# Patient Record
Sex: MALE | Race: WHITE | NOT HISPANIC OR LATINO | Employment: FULL TIME | URBAN - METROPOLITAN AREA
[De-identification: names, ages, dates, MRNs, and addresses within clinical notes are randomized per-mention and may not be internally consistent; named-entity substitution may affect disease eponyms.]

---

## 2019-10-14 ENCOUNTER — OFFICE VISIT (OUTPATIENT)
Dept: FAMILY MEDICINE | Facility: CLINIC | Age: 34
End: 2019-10-14
Payer: COMMERCIAL

## 2019-10-14 ENCOUNTER — ANCILLARY PROCEDURE (OUTPATIENT)
Dept: GENERAL RADIOLOGY | Facility: CLINIC | Age: 34
End: 2019-10-14
Attending: FAMILY MEDICINE
Payer: COMMERCIAL

## 2019-10-14 VITALS
WEIGHT: 270.4 LBS | TEMPERATURE: 98 F | RESPIRATION RATE: 12 BRPM | OXYGEN SATURATION: 98 % | HEIGHT: 72 IN | DIASTOLIC BLOOD PRESSURE: 100 MMHG | BODY MASS INDEX: 36.62 KG/M2 | SYSTOLIC BLOOD PRESSURE: 148 MMHG | HEART RATE: 86 BPM

## 2019-10-14 DIAGNOSIS — E66.01 MORBID OBESITY (H): ICD-10-CM

## 2019-10-14 DIAGNOSIS — M54.50 ACUTE MIDLINE LOW BACK PAIN WITHOUT SCIATICA: ICD-10-CM

## 2019-10-14 DIAGNOSIS — I10 ESSENTIAL HYPERTENSION: ICD-10-CM

## 2019-10-14 DIAGNOSIS — M54.50 ACUTE MIDLINE LOW BACK PAIN WITHOUT SCIATICA: Primary | ICD-10-CM

## 2019-10-14 PROCEDURE — 99203 OFFICE O/P NEW LOW 30 MIN: CPT | Performed by: FAMILY MEDICINE

## 2019-10-14 PROCEDURE — 72100 X-RAY EXAM L-S SPINE 2/3 VWS: CPT

## 2019-10-14 RX ORDER — IBUPROFEN 600 MG/1
600 TABLET, FILM COATED ORAL EVERY 6 HOURS
Qty: 40 TABLET | Refills: 1 | Status: SHIPPED | OUTPATIENT
Start: 2019-10-14 | End: 2019-12-24

## 2019-10-14 RX ORDER — CYCLOBENZAPRINE HCL 10 MG
10 TABLET ORAL 3 TIMES DAILY PRN
Qty: 30 TABLET | Refills: 1 | Status: SHIPPED | OUTPATIENT
Start: 2019-10-14 | End: 2019-12-24

## 2019-10-14 ASSESSMENT — MIFFLIN-ST. JEOR: SCORE: 2209.53

## 2019-10-14 ASSESSMENT — PAIN SCALES - GENERAL: PAINLEVEL: SEVERE PAIN (6)

## 2019-10-14 NOTE — PROGRESS NOTES
SUBJECTIVE:                                                    Shady Maguire is 34 year old male   Chief Complaint   Patient presents with     Back Pain     Symptoms started last Monday while putting on socks, states no injury. States pain increased while putting on boots yesterday. Increased pain with  Midline low back pain to tailbone without radiation. States no numbness or tingling. Has tried IBU, ice, and rest, states not helpful. States no previous back issues.      Problem list and histories reviewed & adjusted, as indicated.  Additional history: works at desk job, finished basement a month ago, no back issues then.    Patient Active Problem List   Diagnosis     Essential hypertension     Acute midline low back pain without sciatica     History reviewed. No pertinent surgical history.    Social History     Tobacco Use     Smoking status: Former Smoker     Packs/day: 0.00     Smokeless tobacco: Current User     Types: Chew   Substance Use Topics     Alcohol use: Yes     History reviewed. No pertinent family history.      Current Outpatient Medications   Medication Sig Dispense Refill     cyclobenzaprine (FLEXERIL) 10 MG tablet Take 1 tablet (10 mg) by mouth 3 times daily as needed for muscle spasms 30 tablet 1     ibuprofen (ADVIL/MOTRIN) 600 MG tablet Take 1 tablet (600 mg) by mouth every 6 hours 40 tablet 1     No Known Allergies  No lab results found.   BP Readings from Last 3 Encounters:   10/14/19 (!) 148/100    Wt Readings from Last 3 Encounters:   10/14/19 122.7 kg (270 lb 6.4 oz)         ROS:  Constitutional, HEENT, cardiovascular, pulmonary, gi and gu systems are negative, except as otherwise noted.    OBJECTIVE:                                                    BP (!) 148/100   Pulse 86   Temp 98  F (36.7  C) (Tympanic)   Resp 12   Ht 1.829 m (6')   Wt 122.7 kg (270 lb 6.4 oz)   SpO2 98%   BMI 36.67 kg/m     GENERAL APPEARANCE ADULT: alert, upset, cooperative  MS: extremities normal, no  peripheral edema  back exam: general movements in the exam room are impaired due to pain, difficulty standing, no ROM checked due to discomfort  SKIN: no suspicious lesions or rashes, warm and moist  NEURO: Alert, oriented, speech and mentation normal  PSYCH: mentation appears normal., affect and mood normal  Diagnostic Test Results:  Xray lumbar spine: extra lumbar vertebrae? Splinting, no lesions     ASSESSMENT/PLAN:                                                    1. Acute midline low back pain without sciatica  Deconditioned and poor core strength, first time back pain with muscle spasms, no disc disease noted on imaging.  - XR Lumbar Spine 2/3 Views; Future  - PHYSICAL THERAPY REFERRAL; Future  - ibuprofen (ADVIL/MOTRIN) 600 MG tablet; Take 1 tablet (600 mg) by mouth every 6 hours  Dispense: 40 tablet; Refill: 1  - cyclobenzaprine (FLEXERIL) 10 MG tablet; Take 1 tablet (10 mg) by mouth 3 times daily as needed for muscle spasms  Dispense: 30 tablet; Refill: 1    2. Essential hypertension  Due to pain    3. Morbid obesity (H)  Ideal weight for height is 160, realistic weight 180 (90 lbs over).  Need to lose at least 10%, 27 pounds in 27 weeks.        Yudy Maloney MD  [facility]

## 2019-10-14 NOTE — NURSING NOTE
Initial BP (!) 148/100   Pulse 86   Temp 98  F (36.7  C) (Tympanic)   Resp 12   Ht 1.829 m (6')   Wt 122.7 kg (270 lb 6.4 oz)   SpO2 98%   BMI 36.67 kg/m   Estimated body mass index is 36.67 kg/m  as calculated from the following:    Height as of this encounter: 1.829 m (6').    Weight as of this encounter: 122.7 kg (270 lb 6.4 oz). .

## 2019-10-14 NOTE — PATIENT INSTRUCTIONS
Scheduled over the counter pain meds.     Ibuprofen 600mg orally every 6 hours scheduled for 10 days and/or   Tylenol 650 mg two orally every 12 hours scheduled for 10 days       Can take additional 1300 mg in a 24 hour time     Taking pain medication on a schedule will help to get ahead of the pain.  You are not waiting for the pain to take the medicine.  An example of scheduled routine for every 6 hours is to take 600 mg of ibuprofen at 6 am, 12 pm, 6 pm, 12 am. If this is not working to manage your pain add Tylenol 1300 mg at 6 am and 6 pm.      If you get good pain relief and it returns when stopped consider using these medications longer.  Risks from long term use of ibuprofen and tylenol  are minimal.  Ibuprofen long term can give you stomach inflammation and stomach ulcers, taking with food helps prevent this.  Consuming tylenol when using alcohol regularily can stress the liver.      Maximum dose of ibuprofen is 2400 mg a day do not exceed 3200 mg daily.  Maximum dose of Tylenol is 4000 mg a day.  Those with kidney or liver disease need to use less or none at all.   It is safe to use both Tylenol and ibuprofen together scheduled.  Expect the pain relief effect to be more than additive.    Remember that the narcotic pain medication may have ibuprofen or acetaminophen in it and that must be counted into the total amount of either for the 24 hours.

## 2019-10-16 ENCOUNTER — MYC MEDICAL ADVICE (OUTPATIENT)
Dept: FAMILY MEDICINE | Facility: CLINIC | Age: 34
End: 2019-10-16

## 2019-10-17 NOTE — TELEPHONE ENCOUNTER
This is not a chemical problem it is a mechanical problem and physical therapy is where we get this fixed.  No stronger medication can solve the cause of the pain.  Please get in to PT and follow their treatment plan and you will be better.

## 2019-10-21 ENCOUNTER — HOSPITAL ENCOUNTER (OUTPATIENT)
Dept: PHYSICAL THERAPY | Facility: CLINIC | Age: 34
Setting detail: THERAPIES SERIES
End: 2019-10-21
Attending: FAMILY MEDICINE
Payer: COMMERCIAL

## 2019-10-21 DIAGNOSIS — M54.50 ACUTE MIDLINE LOW BACK PAIN WITHOUT SCIATICA: ICD-10-CM

## 2019-10-21 PROCEDURE — 97161 PT EVAL LOW COMPLEX 20 MIN: CPT | Mod: GP | Performed by: PHYSICAL THERAPIST

## 2019-10-21 PROCEDURE — 97110 THERAPEUTIC EXERCISES: CPT | Mod: GP | Performed by: PHYSICAL THERAPIST

## 2019-10-21 NOTE — PROGRESS NOTES
10/21/19 1500   General Information   Type of Visit Initial OP Ortho PT Evaluation   Start of Care Date 10/21/19   Referring Physician Yudy Maloney MD   Patient/Family Goals Statement To get back to normal; strength to avoid re-injury   Orders Evaluate and Treat   Date of Order 10/15/19   Certification Required? No   Medical Diagnosis Acute midline low back pain without sciatica   Body Part(s)   Body Part(s) Lumbar Spine/SI   Presentation and Etiology   Pertinent history of current problem (include personal factors and/or comorbidities that impact the POC) Pt reports: 2 weeks ago he was putting coks on at the edge of his bed when he felt he tweaked his back.  Pulled on a boot later that week and that worsened the pain, was bedridden that week.   Muscle relaxor and ibuprofen did not help.  Chiropractor visit did not change.  Pt feels like it is more of a muscle injury.   Impairments A. Pain;B. Decreased WB tolerance;D. Decreased ROM;E. Decreased flexibility   Functional Limitations perform activities of daily living;perform required work activities;perform desired leisure / sports activities   Symptom Location Low back to tailbone, left > right   How/Where did it occur From insidious onset   Onset date of current episode/exacerbation 10/13/19   Chronicity New   Pain rating (0-10 point scale) Best (/10);Worst (/10)   Best (/10) 3   Worst (/10) 10   Pain quality A. Sharp;C. Aching;F. Stabbing   Frequency of pain/symptoms A. Constant   Pain/symptoms are: The same all the time   Pain/symptoms exacerbated by M. Other;B. Walking;G. Certain positions;J. ADL;I. Bending;K. Home tasks  (bending)   Pain/symptoms eased by C. Rest;F. Certain positions   Progression of symptoms since onset: Improved   Current / Previous Interventions   Diagnostic Tests: X-ray   X-ray Results Results   X-ray results MPRESSION: No acute osseous abnormality of the lumbar spine is identified.   Prior Level of Function   Prior Level of  "Function-Mobility Independent   Prior Level of Function-ADLs Independent   Current Level of Function   Current Community Support Family/friend caregiver   Patient role/employment history A. Employed   Employment Comments Works [place], walks 3 blocks to work - pain with this   Living environment [facility]   Fall Risk Screen   Fall screen completed by PT   Have you fallen 2 or more times in the past year? No   Have you fallen and had an injury in the past year? No   Is patient a fall risk? No   Abuse Screen (yes response referral indicated)   Feels Unsafe at Home or Work/School no   Feels Threatened by Someone no   Does Anyone Try to Keep You From Having Contact with Others or Doing Things Outside Your Home? no   Physical Signs of Abuse Present no   System Outcome Measures   Outcome Measures Low Back Pain (see Oswestry and Greta)   Lumbar Spine/SI Objective Findings   Balance/Proprioception (Single Leg Stance) >15 sec partha   Flexion ROM Fingertips to patella increased LBP at end range   Extension ROM 50% increased LBP at end range   Hip Screen (+) no hip IR partha; <30 deg of thoracic rotation partha   Hip Abduction Strength right=4/5; left=3+/5 with pain   Hip Extension Strength partha=4/5   Knee Extension (L3) Strength partha=5/5   Ankle Dorsiflexion (L4) Strength partha=5/5   Great Toe Extension (L5) Strength partha=5/5   Ankle Plantar Flexion (S1) Strength partha=5/5   Hamstring Flexibility 40 deg partha \"this is normal\"   Palpation Pain free with palpation of paraspinals, QL partha   Planned Therapy Interventions   Planned Therapy Interventions ADL retraining;balance training;gait training;joint mobilization;manual therapy;motor coordination training;neuromuscular re-education;ROM;strengthening;stretching   Planned Modality Interventions   Planned Modality Interventions Cryotherapy;Electrical stimulation   Clinical Impression   Criteria for Skilled Therapeutic Interventions Met yes, treatment indicated   PT Diagnosis Acute " LBP secondary to hypomobile thoracic and hips; weak core   Influenced by the following impairments pain; weakness; impaired ROM; impaired gait   Functional limitations due to impairments Difficulty with work duties, sitting   Clinical Presentation Stable/Uncomplicated   Clinical Presentation Rationale (+) motivation; age; overall health     Clinical Decision Making (Complexity) Low complexity   Therapy Frequency other (see comments)   Predicted Duration of Therapy Intervention (days/wks) 1x every 2 weeks, anticipate 1-3 visits   Risk & Benefits of therapy have been explained Yes   Patient, Family & other staff in agreement with plan of care Yes   Clinical Impression Comments Shady presents today with acute LBP that is improving with time.  He will benefit from skilled PT to address the above impairments and functional limitations to avoid future occurence.   Education Assessment   Preferred Learning Style Listening;Demonstration;Pictures/video   Barriers to Learning No barriers   ORTHO GOALS   PT Ortho Eval Goals 1;2;3   Ortho Goal 1   Goal Description Patient will have >=4+/5 partha hip ABD strength to support the lumbar spine with gait.   Target Date 12/02/19   Ortho Goal 2   Goal Description Patient will be able to put on shoes without LBP.   Target Date 11/11/19   Ortho Goal 3   Goal Description Patient will be independent with his HEP to reduce future occurrence of pain and disability.   Target Date 10/21/19   Date Met 10/21/19   Total Evaluation Time   PT Eval, Low Complexity Minutes (79521) 20       Radha Jimenez, PT, DTP, [facility]  Doctor of Physical Therapy #[id number]  [facility]  [phone]  [email or web address]

## 2019-12-24 ENCOUNTER — OFFICE VISIT (OUTPATIENT)
Dept: FAMILY MEDICINE | Facility: CLINIC | Age: 34
End: 2019-12-24
Payer: COMMERCIAL

## 2019-12-24 VITALS
SYSTOLIC BLOOD PRESSURE: 110 MMHG | HEIGHT: 71 IN | OXYGEN SATURATION: 98 % | BODY MASS INDEX: 37.24 KG/M2 | WEIGHT: 266 LBS | RESPIRATION RATE: 18 BRPM | TEMPERATURE: 98.9 F | DIASTOLIC BLOOD PRESSURE: 82 MMHG | HEART RATE: 89 BPM

## 2019-12-24 DIAGNOSIS — Z00.00 ROUTINE GENERAL MEDICAL EXAMINATION AT A HEALTH CARE FACILITY: Primary | ICD-10-CM

## 2019-12-24 PROBLEM — I10 ESSENTIAL HYPERTENSION: Status: RESOLVED | Noted: 2019-10-14 | Resolved: 2019-12-24

## 2019-12-24 PROCEDURE — 99395 PREV VISIT EST AGE 18-39: CPT | Performed by: FAMILY MEDICINE

## 2019-12-24 ASSESSMENT — MIFFLIN-ST. JEOR: SCORE: 2177.66

## 2019-12-24 NOTE — PROGRESS NOTES
3  SUBJECTIVE:   CC: Shady Maguire is an 34 year old male who presents for preventive health visit.   Patient is a 34-year-old male who comes in today for his annual physical.  He is relatively healthy and denies any acute symptoms today.  Does not take any routine medications.  He declined labs today.  Encouraged patient to continue healthy lifestyle.    Healthy Habits:    Do you get at least three servings of calcium containing foods daily (dairy, green leafy vegetables, etc.)? yes    Amount of exercise or daily activities, outside of work: not often     Problems taking medications regularly not applicable    Medication side effects: No    Have you had an eye exam in the past two years? no    Do you see a dentist twice per year? yrly    Do you have sleep apnea, excessive snoring or daytime drowsiness?no          Today's PHQ-2 Score:   PHQ-2 ( 1999 [employer]) 12/24/2019   Q1: Little interest or pleasure in doing things 0   Q2: Feeling down, depressed or hopeless 0   PHQ-2 Score 0       Abuse: Current or Past(Physical, Sexual or Emotional)- No  Do you feel safe in your environment? Yes        Social History     Tobacco Use     Smoking status: Former Smoker     Packs/day: 0.00     Smokeless tobacco: Current User     Types: Chew   Substance Use Topics     Alcohol use: Yes     If you drink alcohol do you typically have >3 drinks per day or >7 drinks per week? No                      Last PSA: No results found for: PSA    Reviewed orders with patient. Reviewed health maintenance and updated orders accordingly - Yes  BP Readings from Last 3 Encounters:   12/24/19 110/82   10/14/19 (!) 148/100    Wt Readings from Last 3 Encounters:   12/24/19 120.7 kg (266 lb)   10/14/19 122.7 kg (270 lb 6.4 oz)                  Patient Active Problem List   Diagnosis     Acute midline low back pain without sciatica     Obesity (BMI 35.0-39.9) with comorbidity (H)     History reviewed. No pertinent surgical history.    Social History  "    Tobacco Use     Smoking status: Former Smoker     Packs/day: 0.00     Smokeless tobacco: Current User     Types: Chew   Substance Use Topics     Alcohol use: Yes     Family History   Problem Relation Age of Onset     Depression Mother      Hypertension Father      Heart Disease Maternal Grandmother      Cancer Maternal Grandfather         skin ca     Heart Disease Maternal Grandfather      Heart Disease Paternal Grandmother      Heart Disease Paternal Grandfather          No current outpatient medications on file.     No Known Allergies    Reviewed and updated as needed this visit by clinical staff  Tobacco  Allergies  Meds  Problems  Med Hx  Surg Hx  Fam Hx  Soc Hx          Reviewed and updated as needed this visit by Provider  Tobacco  Allergies  Meds  Problems  Med Hx  Surg Hx  Fam Hx            ROS:  CONSTITUTIONAL: NEGATIVE for fever, chills, change in weight  INTEGUMENTARY/SKIN: NEGATIVE for worrisome rashes, moles or lesions  EYES: NEGATIVE for vision changes or irritation  ENT: NEGATIVE for ear, mouth and throat problems  RESP: NEGATIVE for significant cough or SOB  CV: NEGATIVE for chest pain, palpitations or peripheral edema  GI: NEGATIVE for nausea, abdominal pain, heartburn, or change in bowel habits   male: negative for dysuria, hematuria, decreased urinary stream, erectile dysfunction, urethral discharge  MUSCULOSKELETAL: NEGATIVE for significant arthralgias or myalgia  NEURO: NEGATIVE for weakness, dizziness or paresthesias  PSYCHIATRIC: NEGATIVE for changes in mood or affect    OBJECTIVE:   /82   Pulse 89   Temp 98.9  F (37.2  C) (Tympanic)   Resp 18   Ht 1.81 m (5' 11.25\")   Wt 120.7 kg (266 lb)   SpO2 98%   BMI 36.84 kg/m    EXAM:  GENERAL: healthy, alert and no distress  EYES: Eyes grossly normal to inspection, PERRL and conjunctivae and sclerae normal  HENT: ear canals and TM's normal, nose and mouth without ulcers or lesions  NECK: no adenopathy, no asymmetry, " "masses, or scars and thyroid normal to palpation  RESP: lungs clear to auscultation - no rales, rhonchi or wheezes  CV: regular rate and rhythm, normal S1 S2, no S3 or S4, no murmur, click or rub, no peripheral edema and peripheral pulses strong  ABDOMEN: soft, nontender, no hepatosplenomegaly, no masses and bowel sounds normal  MS: no gross musculoskeletal defects noted, no edema  SKIN: no suspicious lesions or rashes  PSYCH: mentation appears normal, affect normal/bright    Diagnostic Test Results:  none     ASSESSMENT/PLAN:   1. Routine general medical examination at a health care facility  Patient is a 34 yr old male here for his annual physical. He is otherwise healthy. Asked to continue with healthy living.       COUNSELING:  Reviewed preventive health counseling, as reflected in patient instructions       Regular exercise       Healthy diet/nutrition       Vision screening    Estimated body mass index is 36.84 kg/m  as calculated from the following:    Height as of this encounter: 1.81 m (5' 11.25\").    Weight as of this encounter: 120.7 kg (266 lb).    Weight management plan: Discussed healthy diet and exercise guidelines     reports that he has quit smoking. He smoked 0.00 packs per day. His smokeless tobacco use includes chew.      Counseling Resources:  ATP IV Guidelines  Pooled Cohorts Equation Calculator  FRAX Risk Assessment  ICSI Preventive Guidelines  Dietary Guidelines for Americans, 2010  USDA's MyPlate  ASA Prophylaxis  Lung CA Screening    Cathy Solano MD  [facility]  "

## 2020-03-11 ENCOUNTER — HEALTH MAINTENANCE LETTER (OUTPATIENT)
Age: 35
End: 2020-03-11

## 2020-03-24 NOTE — PROGRESS NOTES
Outpatient Physical Therapy Discharge Note     Patient: Shady Maguire  : 1985    Beginning/End Dates of Reporting Period:  10/21/19 to 3/24/2020    Referring Provider: Yudy Maloney MD    Therapy Diagnosis: LBP     Client Self Report: Pt reports: 2 weeks ago he was putting coks on at the edge of his bed when he felt he tweaked his back.  Pulled on a boot later that week and that worsened the pain, was bedridden that week.   Muscle relaxor and ibuprofen did not help.  Chiropractor visit did not change.  Pt feels like it is more of a muscle injury.    Objective Measurements:  Objective Measure: Greta  Details: 2, 4 medium  Objective Measure: ALEAH  Details: 48%            Goals:  Goal Identifier     Goal Description Patient will have >=4+/5 partha hip ABD strength to support the lumbar spine with gait.   Target Date 19   Date Met      Progress:     Goal Identifier     Goal Description Patient will be able to put on shoes without LBP.   Target Date 19   Date Met      Progress:     Goal Identifier     Goal Description Patient will be independent with his HEP to reduce future occurrence of pain and disability.   Target Date 10/21/19   Date Met  10/21/19   Progress:       Progress Toward Goals:   Progress this reporting period: Shady attended 1 PT session to address his acute LBP; unable to further assess progress due to patient failing to schedule f/u appointment.    Plan:  Discharge from therapy.    Discharge:    Reason for Discharge: Patient has failed to schedule further appointments.    Equipment Issued: theraband    Discharge Plan: Patient to continue home program.      Radha Jimenez, PT, DTP, [facility]  Doctor of Physical Therapy #[id number]  [facility]  [phone]  [email or web address]

## 2021-01-03 ENCOUNTER — HEALTH MAINTENANCE LETTER (OUTPATIENT)
Age: 36
End: 2021-01-03

## 2021-03-07 ENCOUNTER — HEALTH MAINTENANCE LETTER (OUTPATIENT)
Age: 36
End: 2021-03-07

## 2021-08-13 ENCOUNTER — HOSPITAL ENCOUNTER (EMERGENCY)
Facility: CLINIC | Age: 36
Discharge: HOME | End: 2021-08-13
Attending: EMERGENCY MEDICINE | Admitting: EMERGENCY MEDICINE
Payer: COMMERCIAL

## 2021-08-13 VITALS
BODY MASS INDEX: 33.86 KG/M2 | WEIGHT: 250 LBS | SYSTOLIC BLOOD PRESSURE: 115 MMHG | DIASTOLIC BLOOD PRESSURE: 67 MMHG | HEART RATE: 72 BPM | HEIGHT: 72 IN | OXYGEN SATURATION: 89 % | RESPIRATION RATE: 18 BRPM | TEMPERATURE: 98.1 F

## 2021-08-13 DIAGNOSIS — S39.012A STRAIN OF LUMBAR REGION, INITIAL ENCOUNTER: ICD-10-CM

## 2021-08-13 DIAGNOSIS — M62.830 SPASM OF BACK MUSCLES: ICD-10-CM

## 2021-08-13 PROCEDURE — 96374 THER/PROPH/DIAG INJ IV PUSH: CPT | Performed by: EMERGENCY MEDICINE

## 2021-08-13 PROCEDURE — 96375 TX/PRO/DX INJ NEW DRUG ADDON: CPT | Performed by: EMERGENCY MEDICINE

## 2021-08-13 PROCEDURE — 250N000013 HC RX MED GY IP 250 OP 250 PS 637: Performed by: EMERGENCY MEDICINE

## 2021-08-13 PROCEDURE — 99285 EMERGENCY DEPT VISIT HI MDM: CPT | Performed by: EMERGENCY MEDICINE

## 2021-08-13 PROCEDURE — 99285 EMERGENCY DEPT VISIT HI MDM: CPT | Mod: 25 | Performed by: EMERGENCY MEDICINE

## 2021-08-13 PROCEDURE — 250N000011 HC RX IP 250 OP 636: Performed by: EMERGENCY MEDICINE

## 2021-08-13 RX ORDER — DIAZEPAM 10 MG/2ML
5 INJECTION, SOLUTION INTRAMUSCULAR; INTRAVENOUS ONCE
Status: COMPLETED | OUTPATIENT
Start: 2021-08-13 | End: 2021-08-13

## 2021-08-13 RX ORDER — OXYCODONE AND ACETAMINOPHEN 5; 325 MG/1; MG/1
2 TABLET ORAL ONCE
Status: COMPLETED | OUTPATIENT
Start: 2021-08-13 | End: 2021-08-13

## 2021-08-13 RX ORDER — DIAZEPAM 10 MG/2ML
5 INJECTION, SOLUTION INTRAMUSCULAR; INTRAVENOUS ONCE
Status: DISCONTINUED | OUTPATIENT
Start: 2021-08-13 | End: 2021-08-13

## 2021-08-13 RX ORDER — KETOROLAC TROMETHAMINE 30 MG/ML
30 INJECTION, SOLUTION INTRAMUSCULAR; INTRAVENOUS ONCE
Status: DISCONTINUED | OUTPATIENT
Start: 2021-08-13 | End: 2021-08-13 | Stop reason: HOSPADM

## 2021-08-13 RX ORDER — OXYCODONE AND ACETAMINOPHEN 5; 325 MG/1; MG/1
1-2 TABLET ORAL EVERY 4 HOURS PRN
Qty: 15 TABLET | Refills: 0 | Status: SHIPPED | OUTPATIENT
Start: 2021-08-13 | End: 2022-02-16

## 2021-08-13 RX ORDER — KETOROLAC TROMETHAMINE 30 MG/ML
30 INJECTION, SOLUTION INTRAMUSCULAR; INTRAVENOUS ONCE
Status: COMPLETED | OUTPATIENT
Start: 2021-08-13 | End: 2021-08-13

## 2021-08-13 RX ORDER — METHOCARBAMOL 750 MG/1
TABLET, FILM COATED ORAL
Qty: 40 TABLET | Refills: 0 | Status: SHIPPED | OUTPATIENT
Start: 2021-08-13 | End: 2021-12-14

## 2021-08-13 RX ORDER — ONDANSETRON 2 MG/ML
4 INJECTION INTRAMUSCULAR; INTRAVENOUS ONCE
Status: COMPLETED | OUTPATIENT
Start: 2021-08-13 | End: 2021-08-13

## 2021-08-13 RX ADMIN — ONDANSETRON 4 MG: 2 INJECTION INTRAMUSCULAR; INTRAVENOUS at 10:30

## 2021-08-13 RX ADMIN — DIAZEPAM 5 MG: 5 INJECTION, SOLUTION INTRAMUSCULAR; INTRAVENOUS at 09:03

## 2021-08-13 RX ADMIN — KETOROLAC TROMETHAMINE 30 MG: 30 INJECTION, SOLUTION INTRAMUSCULAR; INTRAVENOUS at 10:30

## 2021-08-13 RX ADMIN — OXYCODONE HYDROCHLORIDE AND ACETAMINOPHEN 2 TABLET: 5; 325 TABLET ORAL at 09:02

## 2021-08-13 ASSESSMENT — MIFFLIN-ST. JEOR: SCORE: 2111.99

## 2021-08-13 NOTE — ED PROVIDER NOTES
"  History     Chief Complaint   Patient presents with     Back Pain     given morphine and ativan pta     HPI  Shady Maguire is a 35 year old male who developed sudden onset of pain yesterday AM when he stood  up/got up from a seated position while holding his 2.5 yr old daughter.  He felt a sudden pain and \"pop\" sensation with constant poorly localized back pain since that time.  Then he got up to use the bathroom at ~ 4:00 AM this morning and his back \"seized up\", now with very painful spasms.  No leg radiculopathy, weakness, sensory deficit, or bowel or bladder incontinence.  No abdominal pain, fever or chills.  No UTI signs or symptoms or hematuria.  Similar episodes in the past with benign outcome and clinical course.  X-rays lumbar spine in October 2019 were unremarkable.  EMS was called this morning due to severe limited mobility and pain and he was given morphine 5 mg IV and Ativan 1 mg IV.      Previous Records Reviewed:  XR LUMBAR SPINE 2-3 VIEWS  10/14/2019      HISTORY: Acute midline low back pain without sciatica     FINDINGS: There is a partially lumbarized appearance of the S1  vertebra on the lateral view, with 5 nonrib-bearing lumbar-type  vertebral bodies, and large ribs seen at T12 bilaterally for the  purposes of this dictation. The vertebral body heights appear grossly  maintained. Alignment is normal.  The intervertebral disc spaces  appear largely preserved.     IMPRESSION: No acute osseous abnormality of the lumbar spine is  Identified.    Allergies:  No Known Allergies    Problem List:    Patient Active Problem List    Diagnosis Date Noted     Acute midline low back pain without sciatica 10/14/2019     Priority: Medium     Obesity (BMI 35.0-39.9) with comorbidity (H) 10/14/2019     Priority: Medium        Past Medical History:    History reviewed. No pertinent past medical history.    Past Surgical History:    History reviewed. No pertinent surgical history.    Family History:    Family " History   Problem Relation Age of Onset     Depression Mother      Hypertension Father      Heart Disease Maternal Grandmother      Cancer Maternal Grandfather         skin ca     Heart Disease Maternal Grandfather      Heart Disease Paternal Grandmother      Heart Disease Paternal Grandfather        Social History:  Marital Status:   [2]  Social History     Tobacco Use     Smoking status: Former Smoker     Packs/day: 0.00     Smokeless tobacco: Current User     Types: Chew   Substance Use Topics     Alcohol use: Yes     Drug use: Not Currently        Medications:    methocarbamol (ROBAXIN) 750 MG tablet  oxyCODONE-acetaminophen (PERCOCET) 5-325 MG tablet        Review of Systems  As mentioned above in the history present illness.  All other systems were reviewed and are negative.    Physical Exam   BP: (!) 143/118  Pulse: 84  Temp: 98.1  F (36.7  C)  Resp: 18  Height: 182.9 cm (6')  Weight: 113.4 kg (250 lb)  SpO2: 93 %      Physical Exam  Vitals and nursing note reviewed.   Constitutional:       General: He is not in acute distress.     Appearance: Normal appearance. He is well-developed. He is not ill-appearing or diaphoretic.   HENT:      Head: Normocephalic and atraumatic.      Right Ear: External ear normal.      Left Ear: External ear normal.   Eyes:      General: No scleral icterus.     Extraocular Movements: Extraocular movements intact.      Conjunctiva/sclera: Conjunctivae normal.   Neck:      Trachea: No tracheal deviation.   Cardiovascular:      Rate and Rhythm: Normal rate and regular rhythm.      Heart sounds: Normal heart sounds. No murmur heard.   No friction rub. No gallop.    Pulmonary:      Effort: Pulmonary effort is normal. No respiratory distress.      Breath sounds: Normal breath sounds. No wheezing, rhonchi or rales.   Abdominal:      General: There is no distension.      Palpations: Abdomen is soft.      Tenderness: There is no abdominal tenderness. There is no right CVA tenderness  "or left CVA tenderness.   Musculoskeletal:         General: No swelling.      Cervical back: Normal range of motion and neck supple.      Thoracic back: Normal.      Lumbar back: Spasms and tenderness present. Decreased range of motion.        Back:       Right lower leg: No edema.      Left lower leg: No edema.   Skin:     General: Skin is warm and dry.      Coloration: Skin is not pale.      Findings: No erythema or rash.   Neurological:      General: No focal deficit present.      Mental Status: He is alert and oriented to person, place, and time.      Sensory: No sensory deficit.      Motor: No weakness.   Psychiatric:         Mood and Affect: Mood normal.         Behavior: Behavior normal.         ED Course        Procedures              No results found for this or any previous visit (from the past 24 hour(s)).    Medications   ketorolac (TORADOL) injection 30 mg (30 mg Intravenous Not Given 8/13/21 1105)   oxyCODONE-acetaminophen (PERCOCET) 5-325 MG per tablet 2 tablet (2 tablets Oral Given 8/13/21 0902)   diazepam (VALIUM) injection 5 mg (5 mg Intravenous Given 8/13/21 0903)   ketorolac (TORADOL) injection 30 mg (30 mg Intravenous Given 8/13/21 1030)   ondansetron (ZOFRAN) injection 4 mg (4 mg Intravenous Given 8/13/21 1030)       10:52 AM - he has been sleeping comfortably, pain is reasonably controlled and he appears appropriate for discharge home with continued care at home.      Assessments & Plan (with Medical Decision Making)   35 year old male who developed sudden onset of pain yesterday AM when he stood  up/got up from a seated position while holding his 2.5 yr old daughter.  He felt a sudden pain and \"pop\" sensation with constant poorly localized back pain since that time.  Then he got up to use the bathroom at ~ 4:00 AM this morning and his back \"seized up\", now with very painful spasms.  No evidence of neurogenic claudication or cauda equina syndrome and doubt emergent neurosurgical disease " process or infectious disease process.  Previous episodes of similar pain with benign clinical course and outcome, and unremarkable plain films of the lumbar spine and October 2019.  No current indication for emergent imaging evaluation.  Pain was adequately controlled and he was discharged with instructions for supportive care and Rx for Percocet and Robaxin.  He will continue NSAID.  I made a physical therapy referral for his follow-up, and recommend he follow-up in primary care clinic next week as well.    I have reviewed the nursing notes.    I have reviewed the findings, diagnosis, plan and need for follow up with the patient.    New Prescriptions    METHOCARBAMOL (ROBAXIN) 750 MG TABLET    1 tab by mouth every 4 hrs. or 2 tabs every 8 hrs. as needed for muscle spasm.    OXYCODONE-ACETAMINOPHEN (PERCOCET) 5-325 MG TABLET    Take 1-2 tablets by mouth every 4 hours as needed for moderate to severe pain       Final diagnoses:   Strain of lumbar region, initial encounter   Spasm of back muscles       8/13/2021   [facility] EMERGENCY DEPT     Marlo Yung MD  08/13/21 1124

## 2021-08-18 ENCOUNTER — HOSPITAL ENCOUNTER (OUTPATIENT)
Dept: PHYSICAL THERAPY | Facility: CLINIC | Age: 36
Setting detail: THERAPIES SERIES
End: 2021-08-18
Attending: FAMILY MEDICINE
Payer: COMMERCIAL

## 2021-08-18 DIAGNOSIS — S39.012A STRAIN OF LUMBAR REGION, INITIAL ENCOUNTER: ICD-10-CM

## 2021-08-18 DIAGNOSIS — M62.830 SPASM OF BACK MUSCLES: ICD-10-CM

## 2021-08-18 PROCEDURE — 97162 PT EVAL MOD COMPLEX 30 MIN: CPT | Mod: GP | Performed by: PHYSICAL THERAPIST

## 2021-08-18 PROCEDURE — 97110 THERAPEUTIC EXERCISES: CPT | Mod: GP | Performed by: PHYSICAL THERAPIST
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
## 2021-10-06 NOTE — PROGRESS NOTES
Outpatient Physical Therapy Discharge Note     Patient: Shady Maguire  : 1985    Beginning/End Dates of Reporting Period:  21 to 21 when initially seen.     Referring Provider: Marlo Eng Diagnosis: STrain of Lumbar Region, Mm spasms.      Client Self Report: Currently B LBP.  Has decreased tremendously since onset. Initially mm spasms that brought him into ER. Pain Scale: 1-10/10 in last 2 weeks.     Objective Measurements:  Objective Measure: Greta   Details: 4    Objective Measure: ALEAH   Details: 32    Objective Measure: LB ROM   Details: Flex 50% w/ slight pain. Ext 50%, SB B 25% w/ L sidebending causing tightness in R LB>     Objective Measure: Flexibility  Details: HS's -55 B, L>R Hip Flex, L Quad, Piriformis Tight B, R Soleus Tight.      Outcome Measures (most recent score):  Greta STarT Sub-Score (Q5-9): 2  Greta STarT Total Score (all 9): 4  Oswestry Score (%): 32 %    Goals:  Goal Identifier 1   Goal Description STG: Pt will be able to stand for 30 minutes or more for HH duties.    Target Date 21   Date Met      Progress (detail required for progress note):       Goal Identifier 2   Goal Description STG: Pt will note have any pain w/ sit to stand.    Target Date 21   Date Met      Progress (detail required for progress note):       Goal Identifier 3   Goal Description STG: Pt will be back to normal weight lifting duties at home w/ child.    Target Date 21   Date Met      Progress (detail required for progress note):       Goal Identifier 4   Goal Description LTG: Pt will be independent w/ HEP.    Target Date 21   Date Met      Progress (detail required for progress note):       Plan:  Discharge from therapy.    Discharge:    Reason for Discharge: Patient chooses to discontinue therapy.  Patient has failed to schedule further appointments.    Equipment Issued:      Discharge Plan: Patient to continue home program.  Pt to follow up w/MD as appropriate.    Roma Andres, PT, [facility] (#[id number])  [facility]           [phone]  Fax          [phone]  Appt #      [phone]

## 2021-10-10 ENCOUNTER — HEALTH MAINTENANCE LETTER (OUTPATIENT)
Age: 36
End: 2021-10-10

## 2021-12-14 ENCOUNTER — OFFICE VISIT (OUTPATIENT)
Dept: FAMILY MEDICINE | Facility: CLINIC | Age: 36
End: 2021-12-14
Payer: COMMERCIAL

## 2021-12-14 VITALS
DIASTOLIC BLOOD PRESSURE: 80 MMHG | SYSTOLIC BLOOD PRESSURE: 128 MMHG | OXYGEN SATURATION: 98 % | HEIGHT: 71 IN | HEART RATE: 89 BPM | WEIGHT: 269 LBS | RESPIRATION RATE: 16 BRPM | BODY MASS INDEX: 37.66 KG/M2 | TEMPERATURE: 97.7 F

## 2021-12-14 DIAGNOSIS — G89.29 CHRONIC BILATERAL LOW BACK PAIN WITH RIGHT-SIDED SCIATICA: ICD-10-CM

## 2021-12-14 DIAGNOSIS — M54.41 CHRONIC BILATERAL LOW BACK PAIN WITH RIGHT-SIDED SCIATICA: ICD-10-CM

## 2021-12-14 DIAGNOSIS — Z00.00 ROUTINE GENERAL MEDICAL EXAMINATION AT A HEALTH CARE FACILITY: Primary | ICD-10-CM

## 2021-12-14 DIAGNOSIS — Z13.1 SCREENING FOR DIABETES MELLITUS: ICD-10-CM

## 2021-12-14 DIAGNOSIS — Z13.6 CARDIOVASCULAR SCREENING; LDL GOAL LESS THAN 100: ICD-10-CM

## 2021-12-14 PROCEDURE — 99395 PREV VISIT EST AGE 18-39: CPT | Performed by: NURSE PRACTITIONER

## 2021-12-14 PROCEDURE — 99213 OFFICE O/P EST LOW 20 MIN: CPT | Mod: 25 | Performed by: NURSE PRACTITIONER

## 2021-12-14 RX ORDER — PREDNISOLONE ACETATE 10 MG/ML
SUSPENSION/ DROPS OPHTHALMIC
COMMUNITY
Start: 2021-11-30 | End: 2022-06-17

## 2021-12-14 RX ORDER — METHOCARBAMOL 750 MG/1
TABLET, FILM COATED ORAL
Qty: 30 TABLET | Refills: 1 | Status: SHIPPED | OUTPATIENT
Start: 2021-12-14 | End: 2023-08-16

## 2021-12-14 ASSESSMENT — ENCOUNTER SYMPTOMS
PALPITATIONS: 0
EYE PAIN: 0
HEARTBURN: 0
HEADACHES: 0
WEAKNESS: 0
SORE THROAT: 0
DIARRHEA: 0
CONSTIPATION: 0
NERVOUS/ANXIOUS: 0
ARTHRALGIAS: 0
COUGH: 0
NAUSEA: 0
FEVER: 0
CHILLS: 0
ABDOMINAL PAIN: 0
DIZZINESS: 0
HEMATURIA: 0
JOINT SWELLING: 0
SHORTNESS OF BREATH: 0
MYALGIAS: 0
DYSURIA: 0
FREQUENCY: 0
PARESTHESIAS: 0
HEMATOCHEZIA: 0

## 2021-12-14 ASSESSMENT — MIFFLIN-ST. JEOR: SCORE: 2177.31

## 2021-12-14 NOTE — PROGRESS NOTES
Chief Complaint   Patient presents with     Sinus Problem     Check sinus/possible deviated septum/snoring and unable to breath out of nose/no sinus infections     History of Present Illness   Shady Maguire is a 36 year old male who presents for nose and sinus evaluation. The patient describes symptoms of bilateral nasal obstruction and congestion that seems to alternate sides months at a time.  Currently he is breathing better through his left side with more congestion on the right side.  He feels like his nose really plugs up when he goes outdoors in the cold.  He denies any significant rhinorrhea or postnasal drainage.  He feels like his taste and smell is somewhat decreased in general.  He denies any face pain/pressure/fullness.  He has not had previous nose or sinus surgery.  He has used some topical nasal medicines in the past and did have some trouble using Afrin previously, which she is stopped entirely.  He does have a history of papillary conjunctivitis and does have a scheduled allergy evaluation in January.  He has a remote history of smoking, less than 10 pack years. No history of migraine headache.  No history of asthma.  No aspirin/NSAID sensitivity.    No previous nose or sinus imaging.      Past Medical History  Patient Active Problem List   Diagnosis     Acute midline low back pain without sciatica     Obesity (BMI 35.0-39.9) with comorbidity (H)     Current Medications     Current Outpatient Medications:      budesonide (PULMICORT) 0.5 MG/2ML neb solution, Place 0.5 mg/2 mL budesonide vial in 8 oz normal saline sinus rinse bottle.  Irrigate each nostril with one half of the bottle twice daily., Disp: 360 mL, Rfl: 3     olopatadine (PATADAY) 0.2 % ophthalmic solution, Place 1 drop into both eyes daily Using prn, Disp: , Rfl:      prednisoLONE acetate (PRED FORTE) 1 % ophthalmic suspension, , Disp: , Rfl:      methocarbamol (ROBAXIN) 750 MG tablet, 1 tab by mouth every 4 hrs. or 2 tabs every 8 hrs. as  needed for muscle spasm. (Patient not taking: Reported on 2021), Disp: 30 tablet, Rfl: 1     oxyCODONE-acetaminophen (PERCOCET) 5-325 MG tablet, Take 1-2 tablets by mouth every 4 hours as needed for moderate to severe pain (Patient not taking: Reported on 2021), Disp: 15 tablet, Rfl: 0    Current Facility-Administered Medications:      triamcinolone (KENALOG-40) injection 80 mg, 80 mg, Intramuscular, Once, Bertram Dao MD    Allergies  No Known Allergies    Social History   Social History     Socioeconomic History     Marital status:      Spouse name: None     Number of children: None     Years of education: None     Highest education level: None   Occupational History     None   Tobacco Use     Smoking status: Former Smoker     Packs/day: 0.00     Types: Cigarettes     Quit date: 2010     Years since quittin.9     Smokeless tobacco: Former User     Types: Chew   Vaping Use     Vaping Use: Never used   Substance and Sexual Activity     Alcohol use: Yes     Comment: occ     Drug use: Not Currently     Sexual activity: Yes     Partners: Female   Other Topics Concern     None   Social History Narrative     None     Social Determinants of Health     Financial Resource Strain: Not on file   Food Insecurity: Not on file   Transportation Needs: Not on file   Physical Activity: Not on file   Stress: Not on file   Social Connections: Not on file   Intimate Partner Violence: Not on file   Housing Stability: Not on file       Family History  Family History   Problem Relation Age of Onset     Hypertension Father      Heart Disease Maternal Grandmother      Cancer Maternal Grandfather         skin ca     Heart Disease Maternal Grandfather      Heart Disease Paternal Grandmother      Heart Disease Paternal Grandfather        Review of Systems  As per HPI and PMHx, otherwise 10+ comprehensive system review is negative.    Physical Exam  /82 (BP Location: Right arm, Patient Position: Sitting,  Cuff Size: Adult Large)   Pulse 94   Temp 98.5  F (36.9  C) (Tympanic)   Ht 1.829 m (6')   Wt 122 kg (269 lb)   BMI 36.48 kg/m    GENERAL: Patient is a pleasant, cooperative 36 year old male in no acute distress.  HEAD: Normocephalic, atraumatic.  Hair and scalp are normal.  EYES: Pupils are equal, round, reactive to light and accommodation.  Extraocular movements are intact.  The sclera nonicteric without injection.  The extraocular structures are normal.  EARS: Normal shape and symmetry.  No tenderness when palpating the mastoid or tragal areas bilaterally.   NOSE: Nares are patent.  Nasal mucosa is boggy and inflamed with sticky, inflammatory mucus.  The patient has severe/marked inferior turbinate hypertrophy bilaterally right greater than left.  No obvious nasal cavity masses, polyps, or mucopurulence on anterior rhinoscopy.  NEUROLOGIC: Cranial nerves II through XII are grossly intact.  Voice is strong.  Patient is House-Brackmann I/VI bilaterally.  CARDIOVASCULAR: Extremities are warm and well-perfused.  No significant peripheral edema.  RESPIRATORY: Patient has nonlabored breathing without cough, wheeze, stridor.  PSYCHIATRIC: Patient is alert and oriented.  Mood and affect appear normal.  SKIN: Warm and dry.  No scalp, face, or neck lesions noted.    Procedure: Flexible Nasal Endoscopy  Indication: Chronic nasal congestion, nasal obstruction    To best visualize the sinonasal anatomy and due to the chief complaint and HPI, I proceeded with flexible fiberoptic nasal endoscopy.  The bilateral nasal cavities were anesthetized and decongested with a mixture of lidocaine and neosynephrine.  The bilateral nasal cavities were then examined using flexible fiberoptic nasal endoscope.  The right nasal cavity does show polyposis in the middle meatus at the leading edge of the inferior turbinate, grade 3.  There is sticky, inflammatory mucoid secretions without any purulence.  No nasal masses.  The sphenoethmoid  recess was clear.  The left nasal cavity does show grade 2 polyposis lateral to the middle turbinate.  The sphenoethmoid recess was clear.  The sinonasal mucosa appeared boggy and inflamed with sticky, inflammatory mucus.  The nasal septum deviates to the right of the anterior and mid septum with some cobblestoning on the septum..  The nasopharynx had a normal appearance with normal Eustachian tube openings and fossa of Rosenmuller bilaterally.  Minimal adenoid tissue.  The scope was removed.  The patient tolerated the procedure well.            Assessment and Plan     ICD-10-CM    1. Chronic sinusitis, unspecified location  J32.9 NASAL ENDOSCOPY, DIAGNOSTIC     budesonide (PULMICORT) 0.5 MG/2ML neb solution     triamcinolone (KENALOG-40) injection 80 mg   2. Nasal polyposis  J33.9 NASAL ENDOSCOPY, DIAGNOSTIC     budesonide (PULMICORT) 0.5 MG/2ML neb solution     triamcinolone (KENALOG-40) injection 80 mg   3. Nasal obstruction  J34.89 NASAL ENDOSCOPY, DIAGNOSTIC     budesonide (PULMICORT) 0.5 MG/2ML neb solution     triamcinolone (KENALOG-40) injection 80 mg   4. Deviated nasal septum  J34.2 NASAL ENDOSCOPY, DIAGNOSTIC     budesonide (PULMICORT) 0.5 MG/2ML neb solution     triamcinolone (KENALOG-40) injection 80 mg   5. Nasal turbinate hypertrophy  J34.3 NASAL ENDOSCOPY, DIAGNOSTIC     budesonide (PULMICORT) 0.5 MG/2ML neb solution     triamcinolone (KENALOG-40) injection 80 mg   6. History of allergic rhinitis  Z87.09 NASAL ENDOSCOPY, DIAGNOSTIC     budesonide (PULMICORT) 0.5 MG/2ML neb solution     triamcinolone (KENALOG-40) injection 80 mg     It was my pleasure seeing Shady Maguire today in clinic.  The patient has chronic sinusitis with nasal polyposis.  We discussed the pathophysiology of chronic sinusitis.  We discussed medical and surgical management.  I feel the patient would benefit from nasal saline irrigations with Budesonide.  We discussed proper nasal saline irrigation technique with Budesonide.  The  patient would also benefit from an 80 mg intramuscular injection of Kenalog injection today in office.  We discussed the risks, benefits, options, goals of a intramuscular Kenalog injection today in office including, but not limited to: Risk of pain at injection site, risk of infection, side effects of systemic steroids including blood pressure problems, insulin resistance, weight gain, bone/joint issues, mood alteration.  Patient voiced understanding and is willing to proceed.    Given the patient's history, we will also pursue allergy evaluation, which is currently scheudled      The patient will return in 4-6 weeks for follow-up.  I will contact the patient 1-2 weeks prior to the return appointment to recheck their symptoms.  If symptoms are not improved, we will obtain a sinus CT and review the imaging at the return appointment.      Bertram Dao MD  Department of Otolaryngology-Head and Neck Surgery  [facility]

## 2021-12-14 NOTE — PROGRESS NOTES
SUBJECTIVE:   CC: Shady Maguire is an 36 year old male who presents for preventative health visit.       Patient has been advised of split billing requirements and indicates understanding: Yes  Healthy Habits:     Getting at least 3 servings of Calcium per day:  Yes    Bi-annual eye exam:  Yes    Dental care twice a year:  NO    Sleep apnea or symptoms of sleep apnea:  None    Diet:  Regular (no restrictions)    Frequency of exercise:  1 day/week    Duration of exercise:  Less than 15 minutes    Taking medications regularly:  Yes    Medication side effects:  Not applicable    PHQ-2 Total Score: 0    Additional concerns today:  No          Back Pain       Duration: on and off x 2 years - had to call an ambulance once because he was stuck and couldn't move.         Specific cause: lifting    Description:   Location of pain: low back both  Character of pain: intermittent  Pain radiation:none  New numbness or weakness in legs, not attributed to pain:  no     Intensity: Currently 2/10, mild    History:   Pain interferes with job: No  History of back problems: recurrent self limited episodes of low back pain in the past  Any previous MRI or X-rays: None  Sees a specialist for back pain:  No  Therapies tried without relief: muscle relaxants    Alleviating factors:   Improved by: muscle relaxants and opioids      Precipitating factors:  Worsened by: Lifting          Accompanying Signs & Symptoms:  Risk of Fracture:  None  Risk of Cauda Equina:  None  Risk of Infection:  None  Risk of Cancer:  None  Risk of Ankylosing Spondylitis:  Onset at age <35, male, AND morning back stiffness. no                 Today's PHQ-2 Score:   PHQ-2 ( 1999 Pfizer) 12/14/2021   Q1: Little interest or pleasure in doing things 0   Q2: Feeling down, depressed or hopeless 0   PHQ-2 Score 0   PHQ-2 Total Score (12-17 Years)- Positive if 3 or more points; Administer PHQ-A if positive -   Q1: Little interest or pleasure in doing things Not at all   Q2:  Feeling down, depressed or hopeless Not at all   PHQ-2 Score 0       Abuse: Current or Past(Physical, Sexual or Emotional)- No  Do you feel safe in your environment? Yes    Have you ever done Advance Care Planning? (For example, a Health Directive, POLST, or a discussion with a medical provider or your loved ones about your wishes): No, advance care planning information given to patient to review.  Patient plans to discuss their wishes with loved ones or provider.      Social History     Tobacco Use     Smoking status: Former Smoker     Packs/day: 0.00     Types: Cigarettes     Quit date: 2010     Years since quittin.9     Smokeless tobacco: Former User     Types: Chew   Substance Use Topics     Alcohol use: Yes     Comment: occ         Alcohol Use 2021   Prescreen: >3 drinks/day or >7 drinks/week? No       Last PSA: No results found for: PSA    Reviewed orders with patient. Reviewed health maintenance and updated orders accordingly - Yes      Reviewed and updated as needed this visit by clinical staff  Tobacco  Allergies  Meds   Med Hx  Surg Hx  Fam Hx  Soc Hx       Reviewed and updated as needed this visit by Provider                   Review of Systems   Constitutional: Negative for chills and fever.   HENT: Negative for congestion, ear pain, hearing loss and sore throat.    Eyes: Negative for pain and visual disturbance.   Respiratory: Negative for cough and shortness of breath.    Cardiovascular: Negative for chest pain, palpitations and peripheral edema.   Gastrointestinal: Negative for abdominal pain, constipation, diarrhea, heartburn, hematochezia and nausea.   Genitourinary: Negative for dysuria, frequency, genital sores, hematuria, impotence, penile discharge and urgency.   Musculoskeletal: Negative for arthralgias, joint swelling and myalgias.   Skin: Negative for rash.   Neurological: Negative for dizziness, weakness, headaches and paresthesias.   Psychiatric/Behavioral: Negative for  "mood changes. The patient is not nervous/anxious.      CONSTITUTIONAL: NEGATIVE for fever, chills, change in weight  INTEGUMENTARY/SKIN: NEGATIVE for worrisome rashes, moles or lesions  EYES: NEGATIVE for vision changes or irritation  ENT: NEGATIVE for ear, mouth and throat problems  RESP: NEGATIVE for significant cough or SOB  CV: NEGATIVE for chest pain, palpitations or peripheral edema  GI: NEGATIVE for nausea, abdominal pain, heartburn, or change in bowel habits   male: negative for dysuria, hematuria, decreased urinary stream, erectile dysfunction, urethral discharge  MUSCULOSKELETAL:POSITIVE  for back pain history of   NEURO: NEGATIVE for weakness, dizziness or paresthesias  PSYCHIATRIC: NEGATIVE for changes in mood or affect    OBJECTIVE:   /80   Pulse 89   Temp 97.7  F (36.5  C)   Resp 16   Ht 1.803 m (5' 11\")   Wt 122 kg (269 lb)   SpO2 98%   BMI 37.52 kg/m      Physical Exam  GENERAL: healthy, alert and no distress  EYES: Eyes grossly normal to inspection, PERRL and conjunctivae and sclerae normal  HENT: ear canals and TM's normal, nose and mouth without ulcers or lesions  NECK: no adenopathy, no asymmetry, masses, or scars and thyroid normal to palpation  RESP: lungs clear to auscultation - no rales, rhonchi or wheezes  CV: regular rate and rhythm, normal S1 S2, no S3 or S4, no murmur, click or rub, no peripheral edema and peripheral pulses strong  ABDOMEN: soft, nontender, no hepatosplenomegaly, no masses and bowel sounds normal  MS: no gross musculoskeletal defects noted, no edema  SKIN: no suspicious lesions or rashes  NEURO: Normal strength and tone, mentation intact and speech normal  PSYCH: mentation appears normal, affect normal/bright    Diagnostic Test Results:  Labs reviewed in Epic    ASSESSMENT/PLAN:   (Z00.00) Routine general medical examination at a health care facility  (primary encounter diagnosis)  Comment:   Plan:     (Z13.6) CARDIOVASCULAR SCREENING; LDL GOAL LESS THAN " "100  Comment:   Plan: Lipid panel reflex to direct LDL Fasting,                     (Z13.1) Screening for diabetes mellitus  Comment:   Plan: Glucose            (M54.41,  G89.29) Chronic bilateral low back pain with right-sided sciatica  Comment: no current symptoms however patient would like refill to have on hand of muscle relaxant  Plan: methocarbamol (ROBAXIN) 750 MG tablet  - recommend consult with sports medicine if symptoms reoccur.               Patient has been advised of split billing requirements and indicates understanding: Yes  COUNSELING:   Reviewed preventive health counseling, as reflected in patient instructions       Regular exercise       Healthy diet/nutrition    Estimated body mass index is 37.52 kg/m  as calculated from the following:    Height as of this encounter: 1.803 m (5' 11\").    Weight as of this encounter: 122 kg (269 lb).     Weight management plan: Discussed healthy diet and exercise guidelines    He reports that he quit smoking about 11 years ago. His smoking use included cigarettes. He smoked 0.00 packs per day. He has quit using smokeless tobacco.  His smokeless tobacco use included chew.      Counseling Resources:  ATP IV Guidelines  Pooled Cohorts Equation Calculator  FRAX Risk Assessment  ICSI Preventive Guidelines  Dietary Guidelines for Americans, 2010  USDA's MyPlate  ASA Prophylaxis  Lung CA Screening    [facility]  "

## 2021-12-21 ENCOUNTER — LAB (OUTPATIENT)
Dept: LAB | Facility: CLINIC | Age: 36
End: 2021-12-21
Payer: COMMERCIAL

## 2021-12-21 DIAGNOSIS — Z13.1 SCREENING FOR DIABETES MELLITUS: ICD-10-CM

## 2021-12-21 DIAGNOSIS — Z13.6 CARDIOVASCULAR SCREENING; LDL GOAL LESS THAN 100: ICD-10-CM

## 2021-12-21 LAB
CHOLEST SERPL-MCNC: 242 MG/DL
FASTING STATUS PATIENT QL REPORTED: YES
FASTING STATUS PATIENT QL REPORTED: YES
GLUCOSE BLD-MCNC: 93 MG/DL (ref 70–99)
HDLC SERPL-MCNC: 45 MG/DL
LDLC SERPL CALC-MCNC: 163 MG/DL
NONHDLC SERPL-MCNC: 197 MG/DL
TRIGL SERPL-MCNC: 171 MG/DL

## 2021-12-21 PROCEDURE — 36415 COLL VENOUS BLD VENIPUNCTURE: CPT

## 2021-12-21 PROCEDURE — 82947 ASSAY GLUCOSE BLOOD QUANT: CPT

## 2021-12-21 PROCEDURE — 80061 LIPID PANEL: CPT

## 2021-12-22 ENCOUNTER — OFFICE VISIT (OUTPATIENT)
Dept: OTOLARYNGOLOGY | Facility: CLINIC | Age: 36
End: 2021-12-22
Payer: COMMERCIAL

## 2021-12-22 VITALS
BODY MASS INDEX: 36.44 KG/M2 | HEART RATE: 94 BPM | SYSTOLIC BLOOD PRESSURE: 139 MMHG | TEMPERATURE: 98.5 F | WEIGHT: 269 LBS | HEIGHT: 72 IN | DIASTOLIC BLOOD PRESSURE: 82 MMHG

## 2021-12-22 DIAGNOSIS — J33.9 NASAL POLYPOSIS: ICD-10-CM

## 2021-12-22 DIAGNOSIS — J32.9 CHRONIC SINUSITIS, UNSPECIFIED LOCATION: Primary | ICD-10-CM

## 2021-12-22 DIAGNOSIS — J34.2 DEVIATED NASAL SEPTUM: ICD-10-CM

## 2021-12-22 DIAGNOSIS — Z87.09 HISTORY OF ALLERGIC RHINITIS: ICD-10-CM

## 2021-12-22 DIAGNOSIS — J34.3 NASAL TURBINATE HYPERTROPHY: ICD-10-CM

## 2021-12-22 DIAGNOSIS — J34.89 NASAL OBSTRUCTION: ICD-10-CM

## 2021-12-22 PROCEDURE — 99204 OFFICE O/P NEW MOD 45 MIN: CPT | Mod: 25 | Performed by: OTOLARYNGOLOGY

## 2021-12-22 PROCEDURE — 31231 NASAL ENDOSCOPY DX: CPT | Performed by: OTOLARYNGOLOGY

## 2021-12-22 RX ORDER — TRIAMCINOLONE ACETONIDE 40 MG/ML
80 INJECTION, SUSPENSION INTRA-ARTICULAR; INTRAMUSCULAR ONCE
Status: COMPLETED | OUTPATIENT
Start: 2021-12-22 | End: 2021-12-22

## 2021-12-22 RX ORDER — BUDESONIDE 0.5 MG/2ML
INHALANT ORAL
Qty: 360 ML | Refills: 3 | Status: SHIPPED | OUTPATIENT
Start: 2021-12-22 | End: 2023-06-06

## 2021-12-22 RX ORDER — OLOPATADINE HYDROCHLORIDE 2 MG/ML
1 SOLUTION/ DROPS OPHTHALMIC DAILY
COMMUNITY

## 2021-12-22 RX ADMIN — TRIAMCINOLONE ACETONIDE 80 MG: 40 INJECTION, SUSPENSION INTRA-ARTICULAR; INTRAMUSCULAR at 14:46

## 2021-12-22 ASSESSMENT — MIFFLIN-ST. JEOR: SCORE: 2193.18

## 2021-12-22 NOTE — NURSING NOTE
The following medication was given:     MEDICATION: TRIAMCINOLONE ACETONIDE INJECTABLE SUSPENSION  ROUTE: IM  SITE: Ventrogluteal - Left  DOSE: 80MG  LOT #: [id number]  :  AMNEAL  EXPIRATION DATE:  04/2023  NDC#: [address]-7    Svitlana RICARDO CMA

## 2021-12-22 NOTE — LETTER
12/22/2021         RE: Shady Maguire  [address] [place] [id number]        Dear Colleague,    Thank you for referring your patient, Shady Maguire, to the [facility]. Please see a copy of my visit note below.    Chief Complaint   Patient presents with     Sinus Problem     Check sinus/possible deviated septum/snoring and unable to breath out of nose/no sinus infections     History of Present Illness   Shady Maguire is a 36 year old male who presents for nose and sinus evaluation. The patient describes symptoms of bilateral nasal obstruction and congestion that seems to alternate sides months at a time.  Currently he is breathing better through his left side with more congestion on the right side.  He feels like his nose really plugs up when he goes outdoors in the cold.  He denies any significant rhinorrhea or postnasal drainage.  He feels like his taste and smell is somewhat decreased in general.  He denies any face pain/pressure/fullness.  He has not had previous nose or sinus surgery.  He has used some topical nasal medicines in the past and did have some trouble using Afrin previously, which she is stopped entirely.  He does have a history of papillary conjunctivitis and does have a scheduled allergy evaluation in January.  He has a remote history of smoking, less than 10 pack years. No history of migraine headache.  No history of asthma.  No aspirin/NSAID sensitivity.    No previous nose or sinus imaging.      Past Medical History  Patient Active Problem List   Diagnosis     Acute midline low back pain without sciatica     Obesity (BMI 35.0-39.9) with comorbidity (H)     Current Medications     Current Outpatient Medications:      budesonide (PULMICORT) 0.5 MG/2ML neb solution, Place 0.5 mg/2 mL budesonide vial in 8 oz normal saline sinus rinse bottle.  Irrigate each nostril with one half of the bottle twice daily., Disp: 360 mL, Rfl: 3     olopatadine (PATADAY) 0.2 % ophthalmic  solution, Place 1 drop into both eyes daily Using prn, Disp: , Rfl:      prednisoLONE acetate (PRED FORTE) 1 % ophthalmic suspension, , Disp: , Rfl:      methocarbamol (ROBAXIN) 750 MG tablet, 1 tab by mouth every 4 hrs. or 2 tabs every 8 hrs. as needed for muscle spasm. (Patient not taking: Reported on 2021), Disp: 30 tablet, Rfl: 1     oxyCODONE-acetaminophen (PERCOCET) 5-325 MG tablet, Take 1-2 tablets by mouth every 4 hours as needed for moderate to severe pain (Patient not taking: Reported on 2021), Disp: 15 tablet, Rfl: 0    Current Facility-Administered Medications:      triamcinolone (KENALOG-40) injection 80 mg, 80 mg, Intramuscular, Once, Bertram Dao MD    Allergies  No Known Allergies    Social History   Social History     Socioeconomic History     Marital status:      Spouse name: None     Number of children: None     Years of education: None     Highest education level: None   Occupational History     None   Tobacco Use     Smoking status: Former Smoker     Packs/day: 0.00     Types: Cigarettes     Quit date: 2010     Years since quittin.9     Smokeless tobacco: Former User     Types: Chew   Vaping Use     Vaping Use: Never used   Substance and Sexual Activity     Alcohol use: Yes     Comment: occ     Drug use: Not Currently     Sexual activity: Yes     Partners: Female   Other Topics Concern     None   Social History Narrative     None     Social Determinants of Health     Financial Resource Strain: Not on file   Food Insecurity: Not on file   Transportation Needs: Not on file   Physical Activity: Not on file   Stress: Not on file   Social Connections: Not on file   Intimate Partner Violence: Not on file   Housing Stability: Not on file       Family History  Family History   Problem Relation Age of Onset     Hypertension Father      Heart Disease Maternal Grandmother      Cancer Maternal Grandfather         skin ca     Heart Disease Maternal Grandfather      Heart Disease  Paternal Grandmother      Heart Disease Paternal Grandfather        Review of Systems  As per HPI and PMHx, otherwise 10+ comprehensive system review is negative.    Physical Exam  /82 (BP Location: Right arm, Patient Position: Sitting, Cuff Size: Adult Large)   Pulse 94   Temp 98.5  F (36.9  C) (Tympanic)   Ht 1.829 m (6')   Wt 122 kg (269 lb)   BMI 36.48 kg/m    GENERAL: Patient is a pleasant, cooperative 36 year old male in no acute distress.  HEAD: Normocephalic, atraumatic.  Hair and scalp are normal.  EYES: Pupils are equal, round, reactive to light and accommodation.  Extraocular movements are intact.  The sclera nonicteric without injection.  The extraocular structures are normal.  EARS: Normal shape and symmetry.  No tenderness when palpating the mastoid or tragal areas bilaterally.   NOSE: Nares are patent.  Nasal mucosa is boggy and inflamed with sticky, inflammatory mucus.  The patient has severe/marked inferior turbinate hypertrophy bilaterally right greater than left.  No obvious nasal cavity masses, polyps, or mucopurulence on anterior rhinoscopy.  NEUROLOGIC: Cranial nerves II through XII are grossly intact.  Voice is strong.  Patient is House-Brackmann I/VI bilaterally.  CARDIOVASCULAR: Extremities are warm and well-perfused.  No significant peripheral edema.  RESPIRATORY: Patient has nonlabored breathing without cough, wheeze, stridor.  PSYCHIATRIC: Patient is alert and oriented.  Mood and affect appear normal.  SKIN: Warm and dry.  No scalp, face, or neck lesions noted.    Procedure: Flexible Nasal Endoscopy  Indication: Chronic nasal congestion, nasal obstruction    To best visualize the sinonasal anatomy and due to the chief complaint and HPI, I proceeded with flexible fiberoptic nasal endoscopy.  The bilateral nasal cavities were anesthetized and decongested with a mixture of lidocaine and neosynephrine.  The bilateral nasal cavities were then examined using flexible fiberoptic nasal  endoscope.  The right nasal cavity does show polyposis in the middle meatus at the leading edge of the inferior turbinate, grade 3.  There is sticky, inflammatory mucoid secretions without any purulence.  No nasal masses.  The sphenoethmoid recess was clear.  The left nasal cavity does show grade 2 polyposis lateral to the middle turbinate.  The sphenoethmoid recess was clear.  The sinonasal mucosa appeared boggy and inflamed with sticky, inflammatory mucus.  The nasal septum deviates to the right of the anterior and mid septum with some cobblestoning on the septum..  The nasopharynx had a normal appearance with normal Eustachian tube openings and fossa of Rosenmuller bilaterally.  Minimal adenoid tissue.  The scope was removed.  The patient tolerated the procedure well.            Assessment and Plan     ICD-10-CM    1. Chronic sinusitis, unspecified location  J32.9 NASAL ENDOSCOPY, DIAGNOSTIC     budesonide (PULMICORT) 0.5 MG/2ML neb solution     triamcinolone (KENALOG-40) injection 80 mg   2. Nasal polyposis  J33.9 NASAL ENDOSCOPY, DIAGNOSTIC     budesonide (PULMICORT) 0.5 MG/2ML neb solution     triamcinolone (KENALOG-40) injection 80 mg   3. Nasal obstruction  J34.89 NASAL ENDOSCOPY, DIAGNOSTIC     budesonide (PULMICORT) 0.5 MG/2ML neb solution     triamcinolone (KENALOG-40) injection 80 mg   4. Deviated nasal septum  J34.2 NASAL ENDOSCOPY, DIAGNOSTIC     budesonide (PULMICORT) 0.5 MG/2ML neb solution     triamcinolone (KENALOG-40) injection 80 mg   5. Nasal turbinate hypertrophy  J34.3 NASAL ENDOSCOPY, DIAGNOSTIC     budesonide (PULMICORT) 0.5 MG/2ML neb solution     triamcinolone (KENALOG-40) injection 80 mg   6. History of allergic rhinitis  Z87.09 NASAL ENDOSCOPY, DIAGNOSTIC     budesonide (PULMICORT) 0.5 MG/2ML neb solution     triamcinolone (KENALOG-40) injection 80 mg     It was my pleasure seeing Shady Maguire today in clinic.  The patient has chronic sinusitis with nasal polyposis.  We discussed the  pathophysiology of chronic sinusitis.  We discussed medical and surgical management.  I feel the patient would benefit from nasal saline irrigations with Budesonide.  We discussed proper nasal saline irrigation technique with Budesonide.  The patient would also benefit from an 80 mg intramuscular injection of Kenalog injection today in office.  We discussed the risks, benefits, options, goals of a intramuscular Kenalog injection today in office including, but not limited to: Risk of pain at injection site, risk of infection, side effects of systemic steroids including blood pressure problems, insulin resistance, weight gain, bone/joint issues, mood alteration.  Patient voiced understanding and is willing to proceed.    Given the patient's history, we will also pursue allergy evaluation, which is currently scheudled      The patient will return in 4-6 weeks for follow-up.  I will contact the patient 1-2 weeks prior to the return appointment to recheck their symptoms.  If symptoms are not improved, we will obtain a sinus CT and review the imaging at the return appointment.      Bertram Dao MD  Department of Otolaryngology-Head and Neck Surgery  [facility]         Again, thank you for allowing me to participate in the care of your patient.        Sincerely,        Bertram Dao MD

## 2021-12-22 NOTE — NURSING NOTE
Initial /82 (BP Location: Right arm, Patient Position: Sitting, Cuff Size: Adult Large)   Pulse 94   Temp 98.5  F (36.9  C) (Tympanic)   Ht 1.829 m (6')   Wt 122 kg (269 lb)   BMI 36.48 kg/m   Estimated body mass index is 36.48 kg/m  as calculated from the following:    Height as of this encounter: 1.829 m (6').    Weight as of this encounter: 122 kg (269 lb). .    Alison Sebastian CMA

## 2021-12-22 NOTE — PATIENT INSTRUCTIONS
Per physician instructions      If you have questions or concerns on any instructions given to you by your provider today or if you need to schedule an appointment, you can reach us at [phone].     BUDESONIDE IRRIGATION INSTRUCTIONS    You will be starting Budesonide nasal irrigations and will need to obtain the following:      - NeilMed Sinus Rinse 8 oz Kit  - Distilled or filtered water   - Normal saline salt packets  - Budesonide medication     Place filtered or distilled water into the NeilMed bottle up to the fill line (DO NOT USE TAP OR WELL WATER).  Place the pre-made salt packet in the 8 oz of saline.  Then placed the budesonide medication into the bottle.  Shake the bottle to suspend into solution.  Lean head forward over a sink or a basin.  Rinse each side of the nose with one-half of the bottle (each squeeze is about one-half of the bottle). Rinse the nose twice daily.     You will follow up with your ENT surgeon in 4-6 weeks to recheck your symptoms.  If you are having problems with your irrigations or problems obtaining the medication, please contact your ENT surgeon.    Video example: [email or web address]

## 2022-01-27 ENCOUNTER — OFFICE VISIT (OUTPATIENT)
Dept: ALLERGY | Facility: CLINIC | Age: 37
End: 2022-01-27
Payer: COMMERCIAL

## 2022-01-27 VITALS
OXYGEN SATURATION: 97 % | BODY MASS INDEX: 35.91 KG/M2 | TEMPERATURE: 98.3 F | DIASTOLIC BLOOD PRESSURE: 94 MMHG | HEART RATE: 94 BPM | WEIGHT: 264.77 LBS | SYSTOLIC BLOOD PRESSURE: 132 MMHG

## 2022-01-27 DIAGNOSIS — J33.9 NASAL POLYP: ICD-10-CM

## 2022-01-27 DIAGNOSIS — J31.0 CHRONIC RHINITIS: Primary | ICD-10-CM

## 2022-01-27 DIAGNOSIS — H10.89 OTHER CONJUNCTIVITIS OF BOTH EYES: ICD-10-CM

## 2022-01-27 PROCEDURE — 99203 OFFICE O/P NEW LOW 30 MIN: CPT | Performed by: ALLERGY & IMMUNOLOGY

## 2022-01-27 RX ORDER — IBUPROFEN 200 MG
200 TABLET ORAL
COMMUNITY

## 2022-01-27 ASSESSMENT — ENCOUNTER SYMPTOMS
ARTHRALGIAS: 0
NAUSEA: 0
JOINT SWELLING: 0
FATIGUE: 0
FACIAL SWELLING: 0
VOMITING: 0
WHEEZING: 0
COUGH: 0
DIARRHEA: 0
RHINORRHEA: 0
ADENOPATHY: 0
SHORTNESS OF BREATH: 0
FEVER: 0
EYE ITCHING: 0
EYE REDNESS: 0
MYALGIAS: 0
HEADACHES: 0
ACTIVITY CHANGE: 0
CHEST TIGHTNESS: 0
EYE DISCHARGE: 0
SINUS PRESSURE: 0

## 2022-01-27 NOTE — PATIENT INSTRUCTIONS
Try Pazeo (Pataday extra strength 0.7%) 1 drop/eye once daily as needed. See if that prevent you from needed eye steroids.     Try switching from budesonide to Flonase Sensimist over the counter.         Allergy Staff Appt Hours Shot Hours Locations    Physician     Vargas Moss MD       Support Staff     Julia Laguerre LPN     [place] CMA    Tuesday:   [place] :  [place]: :         :  [place]  [place]        Thursday: : 7:20     [place]        Tuesday: : :: : ::     [place]       Tues & Wed: :       Mon & Thurs: :       Fri: :20           [facility]  [address] [place] [address]  Appt Line: [phone]      [facility]  [address] [place] [address]  Appt Line: [phone]    Pulmonary Function Scheduling:  [place]: [phone]  [place]: [phone]  [phone]     Important Scheduling Information (if recommended by provider):  Aspirin Desensitization: Appt will last 2 clinic days. Please call the Allergy RN line for your clinic to schedule. Discontinue antihistamines 7 days prior to the appointment.     Food Challenges: Appt will last 3-4 hours. Please call the Allergy RN line for your clinic to schedule. Discontinue antihistamines 7 days prior to the appointment.     Penicillin Testing: Appt will last 2-3 hours. Please call the Allergy RN line for your clinic to schedule. Discontinue antihistamines 7 days prior to the appointment.     Skin Testing: Appt will about 40 minutes. Call the appointment line for your clinic to schedule. Discontinue antihistamines 7 days prior to the appointment.     Thank you for trusting us with your Allergy, Asthma, and Immunology care. Please feel free to contact us with any questions or concerns you may have.       [place] Pres[facility] Assistance Program [phone]

## 2022-01-27 NOTE — PROGRESS NOTES
SUBJECTIVE:                                                                   Shady Maguire presents today to our Allergy Clinic at [facility]  for a new patient visit. He is a 36 year old male with a history of chronic rhinitis, nasal polyposis, and giant papillary conjunctivitis.    Shady states that he has a history of giant papillary conjunctivitis for multiple years.  He tried and failed oral antihistamines over-the-counter and some antihistamine eyedrops in the past.  Required ophthalmic steroids in the past.  He had a LASIK procedure done several years ago.  It did improve his symptoms, but he still has issues in the Spring and Fall.  Recently, he was recommended to use olopatadine 0.7%, but he has not tried it yet.    He developed nasal congestion in cold seasons.  It can happen in the spring and fall if the weather is cold.  Nasal congestion is bilateral.    In the past, he tried oxymetazoline only.  As a matter of fact, he developed rhinitis medicamentosa several years ago.  Got better with some sort of a pill taper (steroid?).  He tried a nasal spray over-the-counter for 2 to 3 days, possibly Flonase, but he is not sure.    He was not aware about nasal polyps until he had a nasal endoscopy done in December 2021.  Dr. Dao noticed nasal polyposis on both sides.  He received triamcinolone injection which made him feel better.  He tried budesonide rinses that he could not tolerate for very long time because they caused nosebleeds.        Patient Active Problem List   Diagnosis     Acute midline low back pain without sciatica     Obesity (BMI 35.0-39.9) with comorbidity (H)       History reviewed. No pertinent past medical history.   Problem (# of Occurrences) Relation (Name,Age of Onset)    Allergies (2) Brother (Andreas): Anesthesia, Brother (Juan Pablo): Bees    Cancer (1) Maternal Grandfather: skin ca    Crohn's Disease (1) Brother (Juan Pablo)    Heart Disease (4) Maternal Grandmother, Maternal  Grandfather, Paternal Grandmother, Paternal Grandfather    Hypertension (1) Father        History reviewed. No pertinent surgical history.  Social History     Socioeconomic History     Marital status:      Spouse name: None     Number of children: None     Years of education: None     Highest education level: None   Occupational History     None   Tobacco Use     Smoking status: Former Smoker     Packs/day: 0.00     Types: Cigarettes     Quit date: 2010     Years since quittin.0     Smokeless tobacco: Former User     Types: Chew     Tobacco comment: Smoked for a year while in college-only a social smoker   Vaping Use     Vaping Use: Never used   Substance and Sexual Activity     Alcohol use: Yes     Comment: occ     Drug use: Not Currently     Sexual activity: Yes     Partners: Female   Other Topics Concern     None   Social History Narrative    2022    ENVIRONMENTAL HISTORY: [place] The home is heated with a forced air. They do have central air conditioning. The patient's bedroom is furnished with hard malinda in bedroom.  No pets. There is no history of cockroach or mice infestation. There is/are 0 smokers in the house.  The house does not have a damp basement. Noticed mold growth on basement window ki.     Social Determinants of Health     Financial Resource Strain: Not on file   Food Insecurity: Not on file   Transportation Needs: Not on file   Physical Activity: Not on file   Stress: Not on file   Social Connections: Not on file   Intimate Partner Violence: Not on file   Housing Stability: Not on file           Review of Systems   Constitutional: Negative for activity change, fatigue and fever.   HENT: Positive for congestion and nosebleeds. Negative for dental problem, ear pain, facial swelling, postnasal drip, rhinorrhea, sinus pressure and sneezing.    Eyes: Negative for discharge, redness and itching.   Respiratory: Negative for  cough, chest tightness, shortness of breath and wheezing.    Cardiovascular: Negative for chest pain.   Gastrointestinal: Negative for diarrhea, nausea and vomiting.   Musculoskeletal: Negative for arthralgias, joint swelling and myalgias.   Skin: Negative for rash.   Neurological: Negative for headaches.   Hematological: Negative for adenopathy.   Psychiatric/Behavioral: Negative for behavioral problems and self-injury.           Current Outpatient Medications:      ibuprofen (ADVIL/MOTRIN) 200 MG tablet, Take 200 mg by mouth, Disp: , Rfl:      budesonide (PULMICORT) 0.5 MG/2ML neb solution, Place 0.5 mg/2 mL budesonide vial in 8 oz normal saline sinus rinse bottle.  Irrigate each nostril with one half of the bottle twice daily. (Patient not taking: Reported on 1/27/2022), Disp: 360 mL, Rfl: 3     methocarbamol (ROBAXIN) 750 MG tablet, 1 tab by mouth every 4 hrs. or 2 tabs every 8 hrs. as needed for muscle spasm. (Patient not taking: Reported on 12/22/2021), Disp: 30 tablet, Rfl: 1     olopatadine (PATADAY) 0.2 % ophthalmic solution, Place 1 drop into both eyes daily Using prn (Patient not taking: Reported on 1/27/2022), Disp: , Rfl:      oxyCODONE-acetaminophen (PERCOCET) 5-325 MG tablet, Take 1-2 tablets by mouth every 4 hours as needed for moderate to severe pain (Patient not taking: Reported on 12/22/2021), Disp: 15 tablet, Rfl: 0     prednisoLONE acetate (PRED FORTE) 1 % ophthalmic suspension, , Disp: , Rfl:   Immunization History   Administered Date(s) Administered     COVID-19,PF,Pfizer (12+ Yrs) 04/01/2021, 04/22/2021, 11/30/2021     Td (Adult), Adsorbed 07/22/2010     No Known Allergies  OBJECTIVE:                                                                 BP (!) 132/94 (BP Location: Left arm, Patient Position: Sitting, Cuff Size: Adult Large)   Pulse 94   Temp 98.3  F (36.8  C) (Tympanic)   Wt 120.1 kg (264 lb 12.4 oz)   SpO2 97%   BMI 35.91 kg/m          Physical Exam  Vitals and nursing note  reviewed.   Constitutional:       General: He is not in acute distress.     Appearance: He is not diaphoretic.   HENT:      Head: Normocephalic and atraumatic.      Right Ear: Tympanic membrane, ear canal and external ear normal.      Left Ear: Tympanic membrane, ear canal and external ear normal.      Nose: Mucosal edema (mild) present.      Right Turbinates: Enlarged (mild).      Left Turbinates: Enlarged (mild).      Mouth/Throat:      Lips: Pink.      Mouth: Mucous membranes are moist.      Pharynx: Oropharynx is clear. No pharyngeal swelling, oropharyngeal exudate or posterior oropharyngeal erythema.   Eyes:      General:         Right eye: No discharge.         Left eye: No discharge.      Conjunctiva/sclera: Conjunctivae normal.   Cardiovascular:      Rate and Rhythm: Normal rate and regular rhythm.      Heart sounds: Normal heart sounds. No murmur heard.      Pulmonary:      Effort: Pulmonary effort is normal. No respiratory distress.      Breath sounds: Normal breath sounds and air entry. No stridor, decreased air movement or transmitted upper airway sounds. No decreased breath sounds, wheezing, rhonchi or rales.   Neurological:      Mental Status: He is alert and oriented to person, place, and time.   Psychiatric:         Mood and Affect: Mood normal.         Behavior: Behavior normal.         ASSESSMENT/PLAN:    Chronic rhinitis  Nasal polyp  Other conjunctivitis of both eyes    I offered the patient SPT for aeroallergens.  He declined.  He is worried about the deductible and would like to find the price from the insurance first.  I provided him CPT code for the skin test.  SPT for aeroallergens can help to optimize avoidance measures and offer allergen immunotherapy if he is positive.    I do not see nasal polyps on today's exam, suggesting that triamcinolone improved the size.    If he cannot tolerate budesonide, I recommend trying Flonase Sensimist over-the-counter.    Pataday Extra Strength  (olopatadine 0.7%) seems like a fine idea to try to prevent the need in ophthalmic steroids.    Return if symptoms worsen or fail to improve.    Thank you for allowing us to participate in the care of this patient. Please feel free to contact us if there are any questions or concerns about the patient.    Disclaimer: This note consists of symbols derived from keyboarding, dictation and/or voice recognition software. As a result, there may be errors in the script that have gone undetected. Please consider this when interpreting information found in this chart.    Vargas Moss MD, FAAAAI, FACAAI  Allergy, Asthma and Immunology     [facility]

## 2022-01-27 NOTE — LETTER
1/27/2022         RE: Shady Maguire  [address] [place] [id number]        Dear Colleague,    Thank you for referring your patient, Shady Maguire, to the [facility]. Please see a copy of my visit note below.    SUBJECTIVE:                                                                   Shady Maguire presents today to our Allergy Clinic at [facility]  for a new patient visit. He is a 36 year old male with a history of chronic rhinitis, nasal polyposis, and giant papillary conjunctivitis.    Shady states that he has a history of giant papillary conjunctivitis for multiple years.  He tried and failed oral antihistamines over-the-counter and some antihistamine eyedrops in the past.  Required ophthalmic steroids in the past.  He had a LASIK procedure done several years ago.  It did improve his symptoms, but he still has issues in the Spring and Fall.  Recently, he was recommended to use olopatadine 0.7%, but he has not tried it yet.    He developed nasal congestion in cold seasons.  It can happen in the spring and fall if the weather is cold.  Nasal congestion is bilateral.    In the past, he tried oxymetazoline only.  As a matter of fact, he developed rhinitis medicamentosa several years ago.  Got better with some sort of a pill taper (steroid?).  He tried a nasal spray over-the-counter for 2 to 3 days, possibly Flonase, but he is not sure.    He was not aware about nasal polyps until he had a nasal endoscopy done in December 2021.  Dr. Dao noticed nasal polyposis on both sides.  He received triamcinolone injection which made him feel better.  He tried budesonide rinses that he could not tolerate for very long time because they caused nosebleeds.        Patient Active Problem List   Diagnosis     Acute midline low back pain without sciatica     Obesity (BMI 35.0-39.9) with comorbidity (H)       History reviewed. No pertinent past medical history.   Problem (# of  Occurrences) Relation (Name,Age of Onset)    Allergies (2) Brother (Andreas): Anesthesia, Brother (Juan Pablo): Bees    Cancer (1) Maternal Grandfather: skin ca    Crohn's Disease (1) Brother (Juan Pablo)    Heart Disease (4) Maternal Grandmother, Maternal Grandfather, Paternal Grandmother, Paternal Grandfather    Hypertension (1) Father        History reviewed. No pertinent surgical history.  Social History     Socioeconomic History     Marital status:      Spouse name: None     Number of children: None     Years of education: None     Highest education level: None   Occupational History     None   Tobacco Use     Smoking status: Former Smoker     Packs/day: 0.00     Types: Cigarettes     Quit date: 2010     Years since quittin.0     Smokeless tobacco: Former User     Types: Chew     Tobacco comment: Smoked for a year while in college-only a social smoker   Vaping Use     Vaping Use: Never used   Substance and Sexual Activity     Alcohol use: Yes     Comment: occ     Drug use: Not Currently     Sexual activity: Yes     Partners: Female   Other Topics Concern     None   Social History Narrative    2022    ENVIRONMENTAL HISTORY: [place] The home is heated with a forced air. They do have central air conditioning. The patient's bedroom is furnished with hard malinda in bedroom.  No pets. There is no history of cockroach or mice infestation. There is/are 0 smokers in the house.  The house does not have a damp basement. Noticed mold growth on basement window ki.     Social Determinants of Health     Financial Resource Strain: Not on file   Food Insecurity: Not on file   Transportation Needs: Not on file   Physical Activity: Not on file   Stress: Not on file   Social Connections: Not on file   Intimate Partner Violence: Not on file   Housing Stability: Not on file           Review of Systems   Constitutional: Negative for activity change, fatigue and fever.    HENT: Positive for congestion and nosebleeds. Negative for dental problem, ear pain, facial swelling, postnasal drip, rhinorrhea, sinus pressure and sneezing.    Eyes: Negative for discharge, redness and itching.   Respiratory: Negative for cough, chest tightness, shortness of breath and wheezing.    Cardiovascular: Negative for chest pain.   Gastrointestinal: Negative for diarrhea, nausea and vomiting.   Musculoskeletal: Negative for arthralgias, joint swelling and myalgias.   Skin: Negative for rash.   Neurological: Negative for headaches.   Hematological: Negative for adenopathy.   Psychiatric/Behavioral: Negative for behavioral problems and self-injury.           Current Outpatient Medications:      ibuprofen (ADVIL/MOTRIN) 200 MG tablet, Take 200 mg by mouth, Disp: , Rfl:      budesonide (PULMICORT) 0.5 MG/2ML neb solution, Place 0.5 mg/2 mL budesonide vial in 8 oz normal saline sinus rinse bottle.  Irrigate each nostril with one half of the bottle twice daily. (Patient not taking: Reported on 1/27/2022), Disp: 360 mL, Rfl: 3     methocarbamol (ROBAXIN) 750 MG tablet, 1 tab by mouth every 4 hrs. or 2 tabs every 8 hrs. as needed for muscle spasm. (Patient not taking: Reported on 12/22/2021), Disp: 30 tablet, Rfl: 1     olopatadine (PATADAY) 0.2 % ophthalmic solution, Place 1 drop into both eyes daily Using prn (Patient not taking: Reported on 1/27/2022), Disp: , Rfl:      oxyCODONE-acetaminophen (PERCOCET) 5-325 MG tablet, Take 1-2 tablets by mouth every 4 hours as needed for moderate to severe pain (Patient not taking: Reported on 12/22/2021), Disp: 15 tablet, Rfl: 0     prednisoLONE acetate (PRED FORTE) 1 % ophthalmic suspension, , Disp: , Rfl:   Immunization History   Administered Date(s) Administered     COVID-19,PF,Pfizer (12+ Yrs) 04/01/2021, 04/22/2021, 11/30/2021     Td (Adult), Adsorbed 07/22/2010     No Known Allergies  OBJECTIVE:                                                                 BP (!)  132/94 (BP Location: Left arm, Patient Position: Sitting, Cuff Size: Adult Large)   Pulse 94   Temp 98.3  F (36.8  C) (Tympanic)   Wt 120.1 kg (264 lb 12.4 oz)   SpO2 97%   BMI 35.91 kg/m          Physical Exam  Vitals and nursing note reviewed.   Constitutional:       General: He is not in acute distress.     Appearance: He is not diaphoretic.   HENT:      Head: Normocephalic and atraumatic.      Right Ear: Tympanic membrane, ear canal and external ear normal.      Left Ear: Tympanic membrane, ear canal and external ear normal.      Nose: Mucosal edema (mild) present.      Right Turbinates: Enlarged (mild).      Left Turbinates: Enlarged (mild).      Mouth/Throat:      Lips: Pink.      Mouth: Mucous membranes are moist.      Pharynx: Oropharynx is clear. No pharyngeal swelling, oropharyngeal exudate or posterior oropharyngeal erythema.   Eyes:      General:         Right eye: No discharge.         Left eye: No discharge.      Conjunctiva/sclera: Conjunctivae normal.   Cardiovascular:      Rate and Rhythm: Normal rate and regular rhythm.      Heart sounds: Normal heart sounds. No murmur heard.      Pulmonary:      Effort: Pulmonary effort is normal. No respiratory distress.      Breath sounds: Normal breath sounds and air entry. No stridor, decreased air movement or transmitted upper airway sounds. No decreased breath sounds, wheezing, rhonchi or rales.   Neurological:      Mental Status: He is alert and oriented to person, place, and time.   Psychiatric:         Mood and Affect: Mood normal.         Behavior: Behavior normal.         ASSESSMENT/PLAN:    Chronic rhinitis  Nasal polyp  Other conjunctivitis of both eyes    I offered the patient SPT for aeroallergens.  He declined.  He is worried about the deductible and would like to find the price from the insurance first.  I provided him CPT code for the skin test.  SPT for aeroallergens can help to optimize avoidance measures and offer allergen immunotherapy  if he is positive.    I do not see nasal polyps on today's exam, suggesting that triamcinolone improved the size.    If he cannot tolerate budesonide, I recommend trying Flonase Sensimist over-the-counter.    Pataday Extra Strength (olopatadine 0.7%) seems like a fine idea to try to prevent the need in ophthalmic steroids.    Return if symptoms worsen or fail to improve.    Thank you for allowing us to participate in the care of this patient. Please feel free to contact us if there are any questions or concerns about the patient.    Disclaimer: This note consists of symbols derived from keyboarding, dictation and/or voice recognition software. As a result, there may be errors in the script that have gone undetected. Please consider this when interpreting information found in this chart.    Vargas Moss MD, FAAAAI, FACAAI  Allergy, Asthma and Immunology     [facility]         Again, thank you for allowing me to participate in the care of your patient.        Sincerely,        Vargas Moss MD

## 2022-01-28 ENCOUNTER — OFFICE VISIT (OUTPATIENT)
Dept: ALLERGY | Facility: CLINIC | Age: 37
End: 2022-01-28
Payer: COMMERCIAL

## 2022-01-28 VITALS
BODY MASS INDEX: 35.91 KG/M2 | OXYGEN SATURATION: 97 % | WEIGHT: 264.77 LBS | SYSTOLIC BLOOD PRESSURE: 146 MMHG | TEMPERATURE: 98.3 F | HEART RATE: 94 BPM | DIASTOLIC BLOOD PRESSURE: 96 MMHG

## 2022-01-28 DIAGNOSIS — J31.0 CHRONIC RHINITIS: Primary | ICD-10-CM

## 2022-01-28 DIAGNOSIS — J33.9 NASAL POLYP: ICD-10-CM

## 2022-01-28 DIAGNOSIS — H10.89 OTHER CONJUNCTIVITIS OF BOTH EYES: ICD-10-CM

## 2022-01-28 PROCEDURE — 95004 PERQ TESTS W/ALRGNC XTRCS: CPT | Performed by: ALLERGY & IMMUNOLOGY

## 2022-01-28 PROCEDURE — 99207 PR DROP WITH A PROCEDURE: CPT | Performed by: ALLERGY & IMMUNOLOGY

## 2022-01-28 ASSESSMENT — ENCOUNTER SYMPTOMS
SINUS PRESSURE: 0
WHEEZING: 0
COUGH: 0
DIARRHEA: 0
SHORTNESS OF BREATH: 0
ADENOPATHY: 0
FACIAL SWELLING: 0
NAUSEA: 0
SINUS PAIN: 0
EYE ITCHING: 0
RHINORRHEA: 0
EYE REDNESS: 0
EYE DISCHARGE: 0
HEADACHES: 0
VOMITING: 0
SORE THROAT: 0
CHEST TIGHTNESS: 0

## 2022-01-28 NOTE — NURSING NOTE
Aeroallergen avoidance measures were discussed with the patient and literature was provided.     Julia ROSALES RN  Specialty/Allergy Clinics

## 2022-01-28 NOTE — PATIENT INSTRUCTIONS
Allergy Staff Appt Hours Shot Hours Locations    Physician     Vargas Moss MD       Support Staff     Julia RN     Astrid RN     Shade LPN     Mynor CMA    Tuesday:   [place] :  [place] :         [facility]  [place] [address]  [place]        Thursday: :        Friday: 7-12:20     [place]        Tuesday: : : :: :: :    [place]       Tues & Wed: :       Mon & Thurs: :       Fri: :           [facility]  [address] [place] [address]  Appt Line: [phone]      [facility]  [address] [place] [address]  Appt Line: [phone]    Pulmonary Function Scheduling:  [place]: [phone]  [place]: [phone]  [phone]     Important Scheduling Information (if recommended by provider):  Aspirin Desensitization: Appt will last 2 clinic days. Please call the Allergy RN line for your clinic to schedule. Discontinue antihistamines 7 days prior to the appointment.     Food Challenges: Appt will last 3-4 hours. Please call the Allergy RN line for your clinic to schedule. Discontinue antihistamines 7 days prior to the appointment.     Penicillin Testing: Appt will last 2-3 hours. Please call the Allergy RN line for your clinic to schedule. Discontinue antihistamines 7 days prior to the appointment.     Skin Testing: Appt will about 40 minutes. Call the appointment line for your clinic to schedule. Discontinue antihistamines 7 days prior to the appointment.     Thank you for trusting us with your Allergy, Asthma, and Immunology care. Please feel free to contact us with any questions or concerns you may have.      for; to (do) Prescription Assistance Program [phone]

## 2022-01-28 NOTE — NURSING NOTE
Per provider verbal order, RN placed positive control, negative control, Adult Environmental Panel scratch test.  Consent was obtained prior to procedure.  Once scratch test(s) were placed, patient was monitored for 15 minutes in clinic.  RN read test after 15 minutes and provider was notified of results.  Pt tolerated procedure well.  All questions and concerns were addressed at office visit.     Julia ROSALES   Allergy [facility]

## 2022-01-28 NOTE — PROGRESS NOTES
SUBJECTIVE:                                                                 Shady Maguire is a 36 year old male presenting today to our Allergy Clinic at  [facility], for percutaneous skin puncture testing for aeroallergens.         Patient Active Problem List   Diagnosis     Acute midline low back pain without sciatica     Obesity (BMI 35.0-39.9) with comorbidity (H)       History reviewed. No pertinent past medical history.   Problem (# of Occurrences) Relation (Name,Age of Onset)    Allergies (2) Brother (Andreas): Anesthesia, Brother (Juan Pablo): Bees    Cancer (1) Maternal Grandfather: skin ca    Crohn's Disease (1) Brother (Juan Pablo)    Heart Disease (4) Maternal Grandmother, Maternal Grandfather, Paternal Grandmother, Paternal Grandfather    Hypertension (1) Father        History reviewed. No pertinent surgical history.  Social History     Socioeconomic History     Marital status:      Spouse name: None     Number of children: None     Years of education: None     Highest education level: None   Occupational History     None   Tobacco Use     Smoking status: Former Smoker     Packs/day: 0.00     Types: Cigarettes     Quit date: 2010     Years since quittin.0     Smokeless tobacco: Former User     Types: Chew     Tobacco comment: Smoked for a year while in college-only a social smoker   Vaping Use     Vaping Use: Never used   Substance and Sexual Activity     Alcohol use: Yes     Comment: occ     Drug use: Not Currently     Sexual activity: Yes     Partners: Female   Other Topics Concern     None   Social History Narrative    2022    ENVIRONMENTAL HISTORY: [place] The home is heated with a forced air. They do have central air conditioning. The patient's bedroom is furnished with hard malinda in bedroom.  No pets. There is no history of cockroach or mice infestation. There is/are 0 smokers in the house.  The house does not  have a damp basement. Noticed mold growth on basement window ki.     Social Determinants of Health     Financial Resource Strain: Not on file   Food Insecurity: Not on file   Transportation Needs: Not on file   Physical Activity: Not on file   Stress: Not on file   Social Connections: Not on file   Intimate Partner Violence: Not on file   Housing Stability: Not on file           Review of Systems   HENT: Negative for congestion, ear discharge, ear pain, facial swelling, postnasal drip, rhinorrhea, sinus pressure, sinus pain, sneezing and sore throat.    Eyes: Negative for discharge, redness and itching.   Respiratory: Negative for cough, chest tightness, shortness of breath and wheezing.    Cardiovascular: Negative for chest pain.   Gastrointestinal: Negative for diarrhea, nausea and vomiting.   Skin: Negative for rash.   Neurological: Negative for headaches.   Hematological: Negative for adenopathy.           Current Outpatient Medications:      budesonide (PULMICORT) 0.5 MG/2ML neb solution, Place 0.5 mg/2 mL budesonide vial in 8 oz normal saline sinus rinse bottle.  Irrigate each nostril with one half of the bottle twice daily. (Patient not taking: Reported on 1/27/2022), Disp: 360 mL, Rfl: 3     ibuprofen (ADVIL/MOTRIN) 200 MG tablet, Take 200 mg by mouth (Patient not taking: Reported on 1/28/2022), Disp: , Rfl:      methocarbamol (ROBAXIN) 750 MG tablet, 1 tab by mouth every 4 hrs. or 2 tabs every 8 hrs. as needed for muscle spasm. (Patient not taking: Reported on 12/22/2021), Disp: 30 tablet, Rfl: 1     olopatadine (PATADAY) 0.2 % ophthalmic solution, Place 1 drop into both eyes daily Using prn (Patient not taking: Reported on 1/27/2022), Disp: , Rfl:      oxyCODONE-acetaminophen (PERCOCET) 5-325 MG tablet, Take 1-2 tablets by mouth every 4 hours as needed for moderate to severe pain (Patient not taking: Reported on 12/22/2021), Disp: 15 tablet, Rfl: 0     prednisoLONE acetate (PRED FORTE) 1 % ophthalmic  suspension, , Disp: , Rfl:   Immunization History   Administered Date(s) Administered     COVID-19,PF,Pfizer (12+ Yrs) 04/01/2021, 04/22/2021, 11/30/2021     Td (Adult), Adsorbed 07/22/2010     No Known Allergies  OBJECTIVE:                                                                 BP (!) 146/96 (BP Location: Left arm, Patient Position: Sitting, Cuff Size: Adult Large)   Pulse 94   Temp 98.3  F (36.8  C) (Tympanic)   Wt 120.1 kg (264 lb 12.4 oz)   SpO2 97%   BMI 35.91 kg/m          Physical Exam  Vitals and nursing note reviewed.   HENT:      Head: Normocephalic and atraumatic.      Right Ear: External ear normal.      Left Ear: External ear normal.   Eyes:      General:         Right eye: No discharge.         Left eye: No discharge.      Conjunctiva/sclera: Conjunctivae normal.   Pulmonary:      Effort: Pulmonary effort is normal. No respiratory distress.   Neurological:      Mental Status: He is alert and oriented to person, place, and time.   Psychiatric:         Mood and Affect: Mood normal.         Behavior: Behavior normal.           WORKUP:     ENVIRONMENTAL PERCUTANEOUS SKIN TESTING: ADULT  N[facility] 1/28/2022   Consent Y   Ordering Physician CHRISTINAguilherme   Interpreting Physician CHRISTINAguilherme   Testing Technician Julia ROSALES RN   Location Back   Time start:  9:10 AM   Time End:  9:25 AM   Positive Control: Histatrol*ALK 1 mg/ml 7/8   Negative Control: 50% Glycerin 0   Cat Hair*ALK (10,000 BAU/ml) 0   AP Dog Hair/Dander (1:100 w/v) 0   Dust Mite p. 30,000 AU/ml 30/45   Dust Mite f. (30,000 AU/ml) 8/25   Alberto (W/F in millimeters) 0   Tanvir Grass (100,000 BAU/mL) 0   Red Cedar (W/F in millimeters) 0   Maple/[place] (W/F in millimeters) 0   Hackberry (W/F in millimeters) 0   [place] (W/F in millimeters) 0   [place] *ALK (W/F in millimeters) 0   American Elm (W/F in millimeters) 0   [place] (W/F in millimeters) 0   Black [place] (W/F in millimeters) 0   Birch Mix (W/F in millimeters) 0    [place] (W/F in millimeters) 0   Oak (W/F in millimeters) 0   Cocklebur (W/F in millimeters) 0   [place] (W/F in millimeters) 0   [place] (W/F in millimeters) 0   Careless (W/F in millimeters) 0   Nettle (W/F in millimeters) 0   English Plantain (W/F in millimeters) 0   Kochia (W/F in millimeters) 0   Lamb's Quarter (W/F in millimeters) 0   Marshelder (W/F in millimeters) 0   Ragweed Mix* ALK (W/F in millimeters) 0   Russian Thistle (W/F in millimeters) 0   Sagebrush/Mugwort (W/F in millimeters) 0   Sheep Sorrel (W/F in millimeters) 0   Feather Mix* ALK (W/F in millimeters) 0   Penicillium Mix (1:10 w/v) 0   Curvularia spicifera (1:10 w/v) 0   Epicoccum (1:10 w/v) 0   Aspergillus fumigatus (1:10 w/v): 0   Alternaria tenius (1:10 w/v) 0   H. Cladosporium (1:10 w/v) 0   Phoma herbarum (1:10 w/v) 0          My interpretation: SPT for aeroallergens performed today (January 28, 2022) showed sensitivity to dust mites.  The rest was negative with appropriate responses to positive and negative controls.    ASSESSMENT/PLAN:    Chronic rhinitis  Nasal polyp  Other conjunctivitis of both eyes  Avoidance measures were discussed, and information was provided based on the results of the skin test.  He will try a combination of Flonase Sensimist and Pataday extra strength over-the-counter.  We agreed that if this regimen is not effective, he will consider allergen immunotherapy.  Depending on symptom control, he may also need dupilumab.  So far, he is not ready for neither immunotherapy nor dupilumab.    - ALLERGY SKIN TESTS,ALLERGENS     I will see him back in approximately 3 months to discuss symptoms.  He should set up an appointment sooner if it is needed.      Thank you for allowing us to participate in the care of this patient. Please feel free to contact us if there are any questions or concerns about the patient.    Disclaimer: This note consists of symbols derived from keyboarding, dictation and/or voice recognition  software. As a result, there may be errors in the script that have gone undetected. Please consider this when interpreting information found in this chart.    Vargas Moss MD, FAAAAI, FACAAI  Allergy, Asthma and Immunology      [facility]

## 2022-01-28 NOTE — LETTER
2022         RE: Shady Maguire  [address] [place] [id number]        Dear Colleague,    Thank you for referring your patient, Shady Maguire, to the [facility]. Please see a copy of my visit note below.    SUBJECTIVE:                                                                 Shady Maguire is a 36 year old male presenting today to our Allergy Clinic at  [facility], for percutaneous skin puncture testing for aeroallergens.         Patient Active Problem List   Diagnosis     Acute midline low back pain without sciatica     Obesity (BMI 35.0-39.9) with comorbidity (H)       History reviewed. No pertinent past medical history.   Problem (# of Occurrences) Relation (Name,Age of Onset)    Allergies (2) Brother (Andreas): Anesthesia, Brother (Juan Pablo): Bees    Cancer (1) Maternal Grandfather: skin ca    Crohn's Disease (1) Brother (Juan Pablo)    Heart Disease (4) Maternal Grandmother, Maternal Grandfather, Paternal Grandmother, Paternal Grandfather    Hypertension (1) Father        History reviewed. No pertinent surgical history.  Social History     Socioeconomic History     Marital status:      Spouse name: None     Number of children: None     Years of education: None     Highest education level: None   Occupational History     None   Tobacco Use     Smoking status: Former Smoker     Packs/day: 0.00     Types: Cigarettes     Quit date: 2010     Years since quittin.0     Smokeless tobacco: Former User     Types: Chew     Tobacco comment: Smoked for a year while in college-only a social smoker   Vaping Use     Vaping Use: Never used   Substance and Sexual Activity     Alcohol use: Yes     Comment: occ     Drug use: Not Currently     Sexual activity: Yes     Partners: Female   Other Topics Concern     None   Social History Narrative    2022    ENVIRONMENTAL HISTORY: [place] The home is heated with a  forced air. They do have central air conditioning. The patient's bedroom is furnished with hard malinda in bedroom.  No pets. There is no history of cockroach or mice infestation. There is/are 0 smokers in the house.  The house does not have a damp basement. Noticed mold growth on basement window ki.     Social Determinants of Health     Financial Resource Strain: Not on file   Food Insecurity: Not on file   Transportation Needs: Not on file   Physical Activity: Not on file   Stress: Not on file   Social Connections: Not on file   Intimate Partner Violence: Not on file   Housing Stability: Not on file           Review of Systems   HENT: Negative for congestion, ear discharge, ear pain, facial swelling, postnasal drip, rhinorrhea, sinus pressure, sinus pain, sneezing and sore throat.    Eyes: Negative for discharge, redness and itching.   Respiratory: Negative for cough, chest tightness, shortness of breath and wheezing.    Cardiovascular: Negative for chest pain.   Gastrointestinal: Negative for diarrhea, nausea and vomiting.   Skin: Negative for rash.   Neurological: Negative for headaches.   Hematological: Negative for adenopathy.           Current Outpatient Medications:      budesonide (PULMICORT) 0.5 MG/2ML neb solution, Place 0.5 mg/2 mL budesonide vial in 8 oz normal saline sinus rinse bottle.  Irrigate each nostril with one half of the bottle twice daily. (Patient not taking: Reported on 1/27/2022), Disp: 360 mL, Rfl: 3     ibuprofen (ADVIL/MOTRIN) 200 MG tablet, Take 200 mg by mouth (Patient not taking: Reported on 1/28/2022), Disp: , Rfl:      methocarbamol (ROBAXIN) 750 MG tablet, 1 tab by mouth every 4 hrs. or 2 tabs every 8 hrs. as needed for muscle spasm. (Patient not taking: Reported on 12/22/2021), Disp: 30 tablet, Rfl: 1     olopatadine (PATADAY) 0.2 % ophthalmic solution, Place 1 drop into both eyes daily Using prn (Patient not taking: Reported on 1/27/2022), Disp: , Rfl:       oxyCODONE-acetaminophen (PERCOCET) 5-325 MG tablet, Take 1-2 tablets by mouth every 4 hours as needed for moderate to severe pain (Patient not taking: Reported on 12/22/2021), Disp: 15 tablet, Rfl: 0     prednisoLONE acetate (PRED FORTE) 1 % ophthalmic suspension, , Disp: , Rfl:   Immunization History   Administered Date(s) Administered     COVID-19,PF,Pfizer (12+ Yrs) 04/01/2021, 04/22/2021, 11/30/2021     Td (Adult), Adsorbed 07/22/2010     No Known Allergies  OBJECTIVE:                                                                 BP (!) 146/96 (BP Location: Left arm, Patient Position: Sitting, Cuff Size: Adult Large)   Pulse 94   Temp 98.3  F (36.8  C) (Tympanic)   Wt 120.1 kg (264 lb 12.4 oz)   SpO2 97%   BMI 35.91 kg/m          Physical Exam  Vitals and nursing note reviewed.   HENT:      Head: Normocephalic and atraumatic.      Right Ear: External ear normal.      Left Ear: External ear normal.   Eyes:      General:         Right eye: No discharge.         Left eye: No discharge.      Conjunctiva/sclera: Conjunctivae normal.   Pulmonary:      Effort: Pulmonary effort is normal. No respiratory distress.   Neurological:      Mental Status: He is alert and oriented to person, place, and time.   Psychiatric:         Mood and Affect: Mood normal.         Behavior: Behavior normal.           WORKUP:     ENVIRONMENTAL PERCUTANEOUS SKIN TESTING: ADULT  Narciso Environmental 1/28/2022   Consent Y   Ordering Physician Isa   Interpreting Physician Isa   Testing Technician Julia ROSALES RN   Location Back   Time start:  9:10 AM   Time End:  9:25 AM   Positive Control: Histatrol*ALK 1 mg/ml 7/8   Negative Control: 50% Glycerin 0   Cat Hair*ALK (10,000 BAU/ml) 0   AP Dog Hair/Dander (1:100 w/v) 0   Dust Mite p. 30,000 AU/ml 30/45   Dust Mite f. (30,000 AU/ml) 8/25   [place]ber[place] (W/F in millimeters) 0   Tanvir Grass (100,000 BAU/mL) 0   Red Cedar (W/F in millimeters) 0   Maple/Pine (W/F in millimeters) 0    [place]berry (W/F in millimeters) 0   [place] (W/F in millimeters) 0   [place] *ALK (W/F in millimeters) 0   American Elm (W/F in millimeters) 0   [place] (W/F in millimeters) 0   Black [place] (W/F in millimeters) 0   Birch Mix (W/F in millimeters) 0   [place] (W/F in millimeters) 0   Oak (W/F in millimeters) 0   [place] (W/F in millimeters) 0   [place] (W/F in millimeters) 0   [place] (W/F in millimeters) 0   Careless (W/F in millimeters) 0   Nettle (W/F in millimeters) 0   English Plantain (W/F in millimeters) 0   Kochia (W/F in millimeters) 0   Lamb's Quarter (W/F in millimeters) 0   Marshelder (W/F in millimeters) 0   Ragweed Mix* ALK (W/F in millimeters) 0   Russian Thistle (W/F in millimeters) 0   Sagebrush/Mugwort (W/F in millimeters) 0   Sheep Sorrel (W/F in millimeters) 0   Feather Mix* ALK (W/F in millimeters) 0   Penicillium Mix (1:10 w/v) 0   Curvularia spicifera (1:10 w/v) 0   Epicoccum (1:10 w/v) 0   Aspergillus fumigatus (1:10 w/v): 0   Alternaria tenius (1:10 w/v) 0   H. Cladosporium (1:10 w/v) 0   Phoma herbarum (1:10 w/v) 0          My interpretation: SPT for aeroallergens performed today (January 28, 2022) showed sensitivity to dust mites.  The rest was negative with appropriate responses to positive and negative controls.    ASSESSMENT/PLAN:    Chronic rhinitis  Nasal polyp  Other conjunctivitis of both eyes  Avoidance measures were discussed, and information was provided based on the results of the skin test.  He will try a combination of Flonase Sensimist and Pataday extra strength over-the-counter.  We agreed that if this regimen is not effective, he will consider allergen immunotherapy.  Depending on symptom control, he may also need dupilumab.  So far, he is not ready for neither immunotherapy nor dupilumab.    - ALLERGY SKIN TESTS,ALLERGENS     I will see him back in approximately 3 months to discuss symptoms.  He should set up an appointment sooner if it is needed.      Thank you  for allowing us to participate in the care of this patient. Please feel free to contact us if there are any questions or concerns about the patient.    Disclaimer: This note consists of symbols derived from keyboarding, dictation and/or voice recognition software. As a result, there may be errors in the script that have gone undetected. Please consider this when interpreting information found in this chart.    Vargas Moss MD, FAAMORELIAI, FACMORELIAI  Allergy, Asthma and Immunology      [facility]         Again, thank you for allowing me to participate in the care of your patient.        Sincerely,        Vargas Moss MD

## 2022-02-16 ENCOUNTER — OFFICE VISIT (OUTPATIENT)
Dept: OTOLARYNGOLOGY | Facility: CLINIC | Age: 37
End: 2022-02-16
Payer: COMMERCIAL

## 2022-02-16 VITALS
TEMPERATURE: 97.8 F | HEIGHT: 72 IN | WEIGHT: 264 LBS | SYSTOLIC BLOOD PRESSURE: 143 MMHG | HEART RATE: 87 BPM | DIASTOLIC BLOOD PRESSURE: 93 MMHG | BODY MASS INDEX: 35.76 KG/M2

## 2022-02-16 DIAGNOSIS — J33.9 NASAL POLYPOSIS: ICD-10-CM

## 2022-02-16 DIAGNOSIS — J34.3 NASAL TURBINATE HYPERTROPHY: ICD-10-CM

## 2022-02-16 DIAGNOSIS — J30.89 ALLERGIC RHINITIS DUE TO DUST MITE: ICD-10-CM

## 2022-02-16 DIAGNOSIS — J32.9 CHRONIC SINUSITIS, UNSPECIFIED LOCATION: Primary | ICD-10-CM

## 2022-02-16 DIAGNOSIS — J34.89 NASAL OBSTRUCTION: ICD-10-CM

## 2022-02-16 DIAGNOSIS — J34.2 DEVIATED NASAL SEPTUM: ICD-10-CM

## 2022-02-16 PROCEDURE — 99213 OFFICE O/P EST LOW 20 MIN: CPT | Performed by: OTOLARYNGOLOGY

## 2022-02-16 NOTE — LETTER
2/16/2022         RE: Shady Maguire  [address] [place] [id number]        Dear Colleague,    Thank you for referring your patient, Shady Maguire, to the [facility]. Please see a copy of my visit note below.    Chief Complaint   Patient presents with     Sinus Problem     Follow up sinus issues- has not been saline rinses/-symptoms are about the same as before/has noticed bloody mucous and some bloody noses      History of Present Illness  Shady Maguire is a 36 year old male who presents today for follow-up.  I evaluated the patient on 12/22/2021.  On physical exam, he had signs and symptoms consistent with chronic sinusitis with nasal polyposis.  The patient received a Kenalog injection we started him on budesonide irrigations.  I had contacted the patient to check on his symptoms.  He was doing quite a bit better initially but then developed symptoms concerning for COVID-19.  He subsequently tested positive.  His symptoms improved after testing positive.  He returns today for follow-up.     The patient underwent allergy testing earlier 1/28/2022.  My review of the allergy testing showed positive to dust mite only.     From a symptom standpoint, the patient reports initial improvement after the Kenalog injection but some worsening after he developed COVID-19.  He used the irrigations for quite some time but there was then having significant dry nose and some bloody noses so he stopped using them. He has not had previous nose or sinus surgery.  He is currently using the budesonide irrigations  as needed.  He has a remote history of smoking, less than 10 pack years. No history of migraine headache.  No history of asthma.  No aspirin/NSAID sensitivity. No previous nose or sinus imaging.      Past Medical History  Patient Active Problem List   Diagnosis     Acute midline low back pain without sciatica     Obesity (BMI 35.0-39.9) with comorbidity (H)     Current Medications    Current Outpatient  Medications:      budesonide (PULMICORT) 0.5 MG/2ML neb solution, Place 0.5 mg/2 mL budesonide vial in 8 oz normal saline sinus rinse bottle.  Irrigate each nostril with one half of the bottle twice daily. (Patient not taking: Reported on 2022), Disp: 360 mL, Rfl: 3     ibuprofen (ADVIL/MOTRIN) 200 MG tablet, Take 200 mg by mouth (Patient not taking: Reported on 2022), Disp: , Rfl:      methocarbamol (ROBAXIN) 750 MG tablet, 1 tab by mouth every 4 hrs. or 2 tabs every 8 hrs. as needed for muscle spasm. (Patient not taking: Reported on 2021), Disp: 30 tablet, Rfl: 1     olopatadine (PATADAY) 0.2 % ophthalmic solution, Place 1 drop into both eyes daily Using prn (Patient not taking: Reported on 2022), Disp: , Rfl:      prednisoLONE acetate (PRED FORTE) 1 % ophthalmic suspension, , Disp: , Rfl:     Allergies  No Known Allergies    Social History  Social History     Socioeconomic History     Marital status:      Spouse name: Not on file     Number of children: Not on file     Years of education: Not on file     Highest education level: Not on file   Occupational History     Not on file   Tobacco Use     Smoking status: Former Smoker     Packs/day: 0.00     Types: Cigarettes     Quit date: 2010     Years since quittin.1     Smokeless tobacco: Former User     Types: Chew     Tobacco comment: Smoked for a year while in college-only a social smoker   Vaping Use     Vaping Use: Never used   Substance and Sexual Activity     Alcohol use: Yes     Comment: occ     Drug use: Not Currently     Sexual activity: Yes     Partners: Female   Other Topics Concern     Not on file   Social History Narrative    2022    ENVIRONMENTAL HISTORY: [place] The home is heated with a forced air. They do have central air conditioning. The patient's bedroom is furnished with hard malinda in bedroom.  No pets. There is no history of cockroach or mice  infestation. There is/are 0 smokers in the house.  The house does not have a damp basement. Noticed mold growth on basement window ki.     Social Determinants of Health     Financial Resource Strain: Not on file   Food Insecurity: Not on file   Transportation Needs: Not on file   Physical Activity: Not on file   Stress: Not on file   Social Connections: Not on file   Intimate Partner Violence: Not on file   Housing Stability: Not on file       Family History  Family History   Problem Relation Age of Onset     Hypertension Father      Allergies Brother         Anesthesia     Allergies Brother         Bees     Crohn's Disease Brother      Heart Disease Maternal Grandmother      Cancer Maternal Grandfather         skin ca     Heart Disease Maternal Grandfather      Heart Disease Paternal Grandmother      Heart Disease Paternal Grandfather        Review of Systems  As per HPI and PMHx, otherwise 10 system review including the head and neck, constitutional, eyes, respiratory, GI, skin, neurologic, lymphatic, endocrine, and allergy systems is negative.    Physical Exam  BP (!) 143/93 (BP Location: Right arm, Patient Position: Sitting, Cuff Size: Adult Large)   Pulse 87   Temp 97.8  F (36.6  C) (Tympanic)   Ht 1.829 m (6')   Wt 119.7 kg (264 lb)   BMI 35.80 kg/m    GENERAL: Patient is a pleasant, cooperative 36 year old male in no acute distress.  HEAD: Normocephalic, atraumatic.  Hair and scalp are normal.  EYES: Pupils are equal, round, reactive to light and accommodation.  Extraocular movements are intact.  The sclera nonicteric without injection.  The extraocular structures are normal.  EARS: Normal shape and symmetry.  No tenderness when palpating the mastoid or tragal areas bilaterally.   NOSE: Nares are patent.  Nasal mucosa is significantly dry with some sticky, inflammatory mucus.  The patient has moderate inferior turbinate hypertrophy bilaterally right greater than left.    The patient does have obvious  grade 2 nasal polyps bilaterally.  No nasal masses or mucopurulence on anterior rhinoscopy.    NEUROLOGIC: Cranial nerves II through XII are grossly intact.  Voice is strong.  Patient is House-Brackmann I/VI bilaterally.  CARDIOVASCULAR: Extremities are warm and well-perfused.  No significant peripheral edema.  RESPIRATORY: Patient has nonlabored breathing without cough, wheeze, stridor.  PSYCHIATRIC: Patient is alert and oriented.  Mood and affect appear normal.  SKIN: Warm and dry.  No scalp, face, or neck lesions noted.     Assessment and Plan     ICD-10-CM    1. Chronic sinusitis, unspecified location  J32.9    2. Nasal polyposis  J33.9    3. Nasal obstruction  J34.89    4. Deviated nasal septum  J34.2    5. Nasal turbinate hypertrophy  J34.3    6. Allergic rhinitis due to dust mite  J30.89       It was my pleasure seeing Shady Maguire today in clinic.  The patient's nose is quite dry on examination today.  He did get some benefit from the Kenalog injection but does continue to still struggle with chronic sinus symptoms.  Is not a point where he could have surgery currently.  We will hold off on any CT imaging at this point but would consider CT imaging closer the time of surgical intervention in the future.  We discussed another Kenalog injection in the future if his symptoms worsen.  We will have him hold off on the budesonide irrigations right now given his nasal dryness.  We discussed using Ponaris nasal drops in the nose as well as saline gel as much as possible to keep the nose moist.  We discussed a humidifier for his home.    We discussed dust mite intervention including dust mite covers, washing sheets in hot water several times a week, getting rid of drapery and carpet.    We also discussed his lower extremity pruritus.  This sounds like dry skin since the CeraVe seems to help.  If his symptoms are persistent or worsen, we should get CBC, liver function, TSH.    The patient will contact me in the future  when he is thinking he might undergo surgery.  He will contact me if his symptoms worsen.    Shady to follow up with Primary Care provider regarding elevated blood pressure.    Bertram Dao MD  Department of Otolaryngology-Head and Neck Surgery  [facility]         Again, thank you for allowing me to participate in the care of your patient.        Sincerely,        Bertram Dao MD

## 2022-02-16 NOTE — NURSING NOTE
Initial BP (!) 143/93 (BP Location: Right arm, Patient Position: Sitting, Cuff Size: Adult Large)   Pulse 87   Temp 97.8  F (36.6  C) (Tympanic)   Ht 1.829 m (6')   Wt 119.7 kg (264 lb)   BMI 35.80 kg/m   Estimated body mass index is 35.8 kg/m  as calculated from the following:    Height as of this encounter: 1.829 m (6').    Weight as of this encounter: 119.7 kg (264 lb). .    Alisno Sebastian CMA

## 2022-02-16 NOTE — PATIENT INSTRUCTIONS
Per physician instructions      If you have questions or concerns on any instructions given to you by your provider today or if you need to schedule an appointment, you can reach us at [phone].     Ponaris nasal drops for dry nose    Try saline nasal gel in nose 4-6 times per day    Consider sinus CT if considering surgery

## 2022-04-14 ENCOUNTER — OFFICE VISIT (OUTPATIENT)
Dept: FAMILY MEDICINE | Facility: CLINIC | Age: 37
End: 2022-04-14
Payer: COMMERCIAL

## 2022-04-14 VITALS
OXYGEN SATURATION: 97 % | WEIGHT: 272 LBS | BODY MASS INDEX: 36.89 KG/M2 | RESPIRATION RATE: 18 BRPM | HEART RATE: 91 BPM | TEMPERATURE: 97.6 F | DIASTOLIC BLOOD PRESSURE: 78 MMHG | SYSTOLIC BLOOD PRESSURE: 136 MMHG

## 2022-04-14 DIAGNOSIS — M25.562 ACUTE PAIN OF LEFT KNEE: Primary | ICD-10-CM

## 2022-04-14 DIAGNOSIS — E66.01 MORBID OBESITY (H): ICD-10-CM

## 2022-04-14 PROCEDURE — 99213 OFFICE O/P EST LOW 20 MIN: CPT | Performed by: INTERNAL MEDICINE

## 2022-04-14 ASSESSMENT — PAIN SCALES - GENERAL: PAINLEVEL: MODERATE PAIN (4)

## 2022-04-14 NOTE — PATIENT INSTRUCTIONS
Ibuprofen and Acetaminophen can be alternated for pain.   Here are the max dosing for these medications:    Acetaminophen (Tylenol) 1000mg every 6 hours with food (Maximum of 3000mg/day)   Ibuprofen (Advil) maximum of 800mg three times a day with food       *    Knee MRI was ordered.  Please call [phone] to schedule if pain persists.

## 2022-04-14 NOTE — PROGRESS NOTES
Assessment & Plan     Acute on chronic pain of left knee    Shady presents with an acute episode of left knee pain for which he has had intermittent episodes over a number of years since being a .  Exam suggestive of possible MCL or meniscus etiology, with the latter seeming to fit in better with his history of intermittent symptoms.  X-ray was normal by my read and radiologist read.  Discussed home care with continuing ibuprofen, ice, rest.  If symptoms persist or continue to recur, discussed MRI for further evaluation- future order placed if needed.        - XR Knee Standing Left 2 Views; Future  - MR Knee Left w/o Contrast; Future    Morbid obesity (H)    Known issue that I take into account for their medical decisions; no current exacerbations or new concerns.         Fantasma Frazier MD  [facility]    Subjective   Shady is a 36 year old who presents for the following health issues     Knee Pain    History of Present Illness       Reason for visit:  Knee pain  Symptom onset:  1-3 days ago    He eats 2-3 servings of fruits and vegetables daily.He consumes 1 sweetened beverage(s) daily.He exercises with enough effort to increase his heart rate 10 to 19 minutes per day.  He exercises with enough effort to increase his heart rate 4 days per week.   He is taking medications regularly.       Concern -left  knee pain  Onset: started last evening, started developing while sitting watching TV in the evening then woke at 3am with severe pain and couldn't fall back asleep so he decided to schedule an appointment  Description: pain in the joint or bone, no known injury, no redness, no swelling, pain if pressing on inner knee.  No fevers or chills  Intensity: severe, when walking moderate when sitting , 4/10  Progression of Symptoms:  same  Accompanying Signs & Symptoms: none  Previous history of similar problem: no known problems, played hockey when he was younger and had some mild knee pain  issues.  Sometimes tweaks his knees going up the stairs and will have pain for a week or so.  No popping or locking  Precipitating factors:        Worsened by: walking  Alleviating factors:        Improved by: sitting ,ibuprofen  Therapies tried and outcome: sitting and ibuprofen 600mg once- isn't sure if it helped since he fell asleep      Review of Systems   Constitutional, MSK systems are negative, except as otherwise noted.      Objective    /78 (BP Location: Left arm, Patient Position: Sitting, Cuff Size: Adult Large)   Pulse 91   Temp 97.6  F (36.4  C) (Tympanic)   Resp 18   Wt 123.4 kg (272 lb)   SpO2 97%   BMI 36.89 kg/m    Body mass index is 36.89 kg/m .  Physical Exam     GENERAL: healthy, alert and no distress  MS: left knee: pain to palpation medially without swelling and no redness noted, no joint laxity, he has some medial pain with valgus stress test.  Does have some medial pain with rotation of the lower leg as well      No results found for this or any previous visit (from the past 24 hour(s)).

## 2022-04-18 ENCOUNTER — MYC MEDICAL ADVICE (OUTPATIENT)
Dept: FAMILY MEDICINE | Facility: CLINIC | Age: 37
End: 2022-04-18
Payer: COMMERCIAL

## 2022-04-18 DIAGNOSIS — M05.80 POLYARTHRITIS WITH POSITIVE RHEUMATOID FACTOR (H): ICD-10-CM

## 2022-04-18 DIAGNOSIS — M25.50 MULTIPLE JOINT PAIN: Primary | ICD-10-CM

## 2022-04-18 NOTE — TELEPHONE ENCOUNTER
Dr Frazier  See [facility]  Pt seen for left knee pain  Now having severe left sided multiple site joint pain.  Wonders what he should do      Denver Esparza RN

## 2022-04-22 ENCOUNTER — LAB (OUTPATIENT)
Dept: LAB | Facility: CLINIC | Age: 37
End: 2022-04-22
Payer: COMMERCIAL

## 2022-04-22 DIAGNOSIS — M25.50 MULTIPLE JOINT PAIN: ICD-10-CM

## 2022-04-22 LAB
B BURGDOR IGG+IGM SER QL: 0.71
CRP SERPL-MCNC: <2.9 MG/L (ref 0–8)
ERYTHROCYTE [SEDIMENTATION RATE] IN BLOOD BY WESTERGREN METHOD: 7 MM/HR (ref 0–15)

## 2022-04-22 PROCEDURE — 85652 RBC SED RATE AUTOMATED: CPT

## 2022-04-22 PROCEDURE — 36415 COLL VENOUS BLD VENIPUNCTURE: CPT

## 2022-04-22 PROCEDURE — 86200 CCP ANTIBODY: CPT

## 2022-04-22 PROCEDURE — 86431 RHEUMATOID FACTOR QUANT: CPT

## 2022-04-22 PROCEDURE — 86140 C-REACTIVE PROTEIN: CPT

## 2022-04-22 PROCEDURE — 86038 ANTINUCLEAR ANTIBODIES: CPT

## 2022-04-22 PROCEDURE — 86618 LYME DISEASE ANTIBODY: CPT

## 2022-04-25 LAB
ANA SER QL IF: NEGATIVE
CCP AB SER IA-ACNC: 139 U/ML
RHEUMATOID FACT SER NEPH-ACNC: 31 IU/ML

## 2022-06-07 NOTE — PROGRESS NOTES
Rheumatology Clinic Visit  [facility]  TRESSA Downey MRN# [id number]   YOB: 1985 Age: 36 year old   Date of Visit: 06/07/2022  Primary care provider: [facility]          Assessment and Plan:     1.  Rheumatoid arthritis  2.  High-risk medication use  3.  Elevated rheumatoid factor  4.  CCP antibody positive    Patient presents today for initial evaluation of polyarthritis.  This seems to have started after he had COVID in January.  It became more severe in March of this year when he had woken up with left knee pain.  Approximately 2 days later he had severe left arm/shoulder and wrist pain and was unable to lift his arm up.  He had reached out to his primary care provider and blood work was performed showing an elevated rheumatoid factor and CCP antibody positive.  He was given 5 mg daily of prednisone and did notice an improvement with that.  Physical examination was remarkable for tenderness along the patella tendon on the right.  He otherwise had normal range of motion.  No synovitis or dactylitis.  Laboratory evaluation from 4/22/2022 was reviewed and showed a CCP antibody of 139 and a rheumatoid factor of 31.  Sed rate and CRP were normal.  TIMOTHY was negative.    Discussed with the patient that given the symptoms that he is having along with the blood work, he has rheumatoid arthritis.  We discussed the treatment of rheumatoid arthritis including immunomodulatory medication.  For the flares we can use prednisone.  Risks associated with both methotrexate and prednisone were reviewed with the patient today.  Patient does not drink alcohol more than 1-2 drinks per week.  He does still periodic smoke and was encouraged to completely discontinue the use of cigarettes.  Weight loss was also encouraged.  Discussed the importance of maintaining good joint health with strength training and low-impact cardio.  We will get blood work today as he has not had a  CBC or a CMP checked.  I would like to know what his liver function tests are prior to initiating methotrexate.  He is not high risk for hepatitis C or HIV.  No out of the country travel or known exposure to tuberculosis.    Plan:     1. Schedule follow-up with Leonor Awad PA-C in 1 month.   2. Imaging: xray hands and wrists  3. Labs: CBC and CMP today, CBC, creatinine, Albumin and AST/ALT in 1 month (prior to next visit)  4. Medication recommendations:   a. Methotrexate: Will start you on 10mg (4 tablets) WEEKLY for 1 week (you can take all 4 tablets at once), then increase to 15mg (6 tablets) weekly (for this dose, I want you to take 3 tablets in the AM and 3 tablets in the evening). While on Methotrexate:  b. -check labs (ALT/AST, albumin, CBC with platelets and creatinine) monthly  c. -Limit alcohol to 2 drinks weekly  d. -Take Folic Acid 1mg daily   e. -Tylenol 500-1000mg can be used as needed up to three times daily for nausea/headache associated with dosing  f. Do not use NSAIDs such as Ibuprofen/Advil/Aleve   g. Prednisone 5mg:  Take 10mg (2 tablets) daily for 1 week, then take 7.5mg (1.5 tablets) daily for 1 week, then take 5mg (1 tablet) daily for 1 week then stop. Take with food    TRESSA Downey  Rheumatology         History of Present Illness:   Shady Maguire presents for evaluation of polyarthritis.      He has been keeping track of pain that has started and where it has been. This initiaed after having knee pain that was severe. Xray was normal. 2 days later he was unable lift his left arm up. He then had blood work done and put together a list. He has had pain in multiple joints including right wrist, left knee, left elbow, multiple fingers, left wrist, hips, shoulders, knees and toes. In these joints he has had sharp pains with movements, some pain even with rest. Difficulty with gripping. The pains have lasted anywhere from 1/2 day to 3+ days. He started prednisone. He states that seemed to  "help awhile. He started it on 5/9/2022. Before that every other day something new would start. He had bout 1.5 weeks with no pain, missed a couple prednisone doses and got back on it. Since then, it is back to every couple days. He has not had any morning stiffness. He can usually feel things starting the night before. He can feel a pain starting and the next morning he has the pain in that joint. Over the last couple years he \"threw\" his back out twice. It has not been back to 100% since then. He is unsure if he has had joint swelling. He has asked his wife as well, but they have not noticed significant swelling. No fatigue. No shortness of breath, coughing or chest pain. No skin rashes or mouth sores. History of smoking, limited now.     No family history of RA or lupus. He has a brother with crohn's. No personal history of psoriasis, ulcerative colitis or crohn's.          Review of Systems:     Constitutional: negative  Skin: negative  Eyes: negative  Ears/Nose/Throat: negative  Respiratory: No shortness of breath, dyspnea on exertion, cough, or hemoptysis  Cardiovascular: negative  Gastrointestinal: negative  Genitourinary: negative  Musculoskeletal: As above  Neurologic: negative  Psychiatric: negative  Hematologic/Lymphatic/Immunologic: negative  Endocrine: negative         Active Problem List:     Patient Active Problem List    Diagnosis Date Noted     Acute midline low back pain without sciatica 10/14/2019     Priority: Medium     Obesity (BMI 35.0-39.9) with comorbidity (H) 10/14/2019     Priority: Medium            Past Medical History:   No past medical history on file.  No past surgical history on file.         Social History:     Social History     Socioeconomic History     Marital status:      Spouse name: Not on file     Number of children: Not on file     Years of education: Not on file     Highest education level: Not on file   Occupational History     Not on file   Tobacco Use     Smoking " status: Former Smoker     Packs/day: 0.00     Types: Cigarettes     Quit date: 2010     Years since quittin.4     Smokeless tobacco: Former User     Types: Chew     Tobacco comment: Smoked for a year while in college-only a social smoker   Vaping Use     Vaping Use: Never used   Substance and Sexual Activity     Alcohol use: Not Currently     Comment: occ     Drug use: Not Currently     Sexual activity: Yes     Partners: Female   Other Topics Concern     Not on file   Social History Narrative    2022    ENVIRONMENTAL HISTORY: [place] The home is heated with a forced air. They do have central air conditioning. The patient's bedroom is furnished with hard malinda in bedroom.  No pets. There is no history of cockroach or mice infestation. There is/are 0 smokers in the house.  The house does not have a damp basement. Noticed mold growth on basement window ki.     Social Determinants of Health     Financial Resource Strain: Not on file   Food Insecurity: Not on file   Transportation Needs: Not on file   Physical Activity: Not on file   Stress: Not on file   Social Connections: Not on file   Intimate Partner Violence: Not on file   Housing Stability: Not on file          Family History:     Family History   Problem Relation Age of Onset     Hypertension Father      Allergies Brother         Anesthesia     Allergies Brother         Bees     Crohn's Disease Brother      Heart Disease Maternal Grandmother      Cancer Maternal Grandfather         skin ca     Heart Disease Maternal Grandfather      Heart Disease Paternal Grandmother      Heart Disease Paternal Grandfather             Allergies:   No Known Allergies         Medications:     Current Outpatient Medications   Medication Sig Dispense Refill     budesonide (PULMICORT) 0.5 MG/2ML neb solution Place 0.5 mg/2 mL budesonide vial in 8 oz normal saline sinus rinse bottle.  Irrigate each nostril with  one half of the bottle twice daily. (Patient not taking: No sig reported) 360 mL 3     ibuprofen (ADVIL/MOTRIN) 200 MG tablet Take 200 mg by mouth       methocarbamol (ROBAXIN) 750 MG tablet 1 tab by mouth every 4 hrs. or 2 tabs every 8 hrs. as needed for muscle spasm. (Patient not taking: Reported on 12/22/2021) 30 tablet 1     olopatadine (PATADAY) 0.2 % ophthalmic solution Place 1 drop into both eyes daily Using prn (Patient not taking: No sig reported)       prednisoLONE acetate (PRED FORTE) 1 % ophthalmic suspension  (Patient not taking: No sig reported)       predniSONE (DELTASONE) 5 MG tablet Take 1 tablet (5 mg) by mouth daily 30 tablet 0            Physical Exam:   There were no vitals taken for this visit.  Wt Readings from Last 6 Encounters:   04/14/22 123.4 kg (272 lb)   02/16/22 119.7 kg (264 lb)   01/28/22 120.1 kg (264 lb 12.4 oz)   01/27/22 120.1 kg (264 lb 12.4 oz)   12/22/21 122 kg (269 lb)   12/14/21 122 kg (269 lb)     Constitutional: well-developed, appearing stated age; cooperative  Eyes: nl PERRLA, vision, conjunctiva, sclera  ENT: nl external ears, nose, hearing, lips, teeth, gums, throat. No mucositis.   No mucous membrane lesions, normal saliva pool  Neck: no mass or thyroid enlargement  Resp: lungs clear to auscultation  CV: RRR, no murmurs, rubs or gallops, no edema  Lymph: no cervical, supraclavicular or epitrochlear nodes  MS: The TMJ,  shoulder, elbow, wrist, MCP/PIP/DIP,  hip, knee, ankle, and foot MTP/IP joints were examined and found normal. No active synovitis or altered joint anatomy. Full joint ROM.  No dactylitis.  He does have tenderness along the right patella tendon.  Skin: no nail pitting, alopecia, rash, nodules or lesions.   Neuro: nl cranial nerves.   Psych: nl judgement, orientation, memory, affect.           Data:   Imaging:  Xray left knee 4/14/2022  Impression:     1.  Normal left knee. Normal joint alignment and spacing. No fracture  or bone lesion. No significant  joint effusion.    Laboratory:  4/22/2022  CRP <2.9    Rheumatoid factor 31  Sed rate 7  Lyme 0.71  TIMOTHY negative

## 2022-06-08 ENCOUNTER — ANCILLARY PROCEDURE (OUTPATIENT)
Dept: GENERAL RADIOLOGY | Facility: CLINIC | Age: 37
End: 2022-06-08
Attending: PHYSICIAN ASSISTANT
Payer: COMMERCIAL

## 2022-06-08 ENCOUNTER — OFFICE VISIT (OUTPATIENT)
Dept: RHEUMATOLOGY | Facility: CLINIC | Age: 37
End: 2022-06-08
Attending: INTERNAL MEDICINE
Payer: COMMERCIAL

## 2022-06-08 VITALS
SYSTOLIC BLOOD PRESSURE: 136 MMHG | DIASTOLIC BLOOD PRESSURE: 70 MMHG | HEIGHT: 72 IN | BODY MASS INDEX: 36.98 KG/M2 | WEIGHT: 273 LBS

## 2022-06-08 DIAGNOSIS — Z79.899 HIGH RISK MEDICATION USE: ICD-10-CM

## 2022-06-08 DIAGNOSIS — M06.9 RHEUMATOID ARTHRITIS INVOLVING MULTIPLE JOINTS (H): ICD-10-CM

## 2022-06-08 DIAGNOSIS — R76.8 ELEVATED RHEUMATOID FACTOR: ICD-10-CM

## 2022-06-08 DIAGNOSIS — R76.8 CYCLIC CITRULLINATED PEPTIDE (CCP) ANTIBODY POSITIVE: ICD-10-CM

## 2022-06-08 DIAGNOSIS — M06.9 RHEUMATOID ARTHRITIS INVOLVING MULTIPLE JOINTS (H): Primary | ICD-10-CM

## 2022-06-08 LAB
ALBUMIN SERPL-MCNC: 4 G/DL (ref 3.4–5)
ALP SERPL-CCNC: 82 U/L (ref 40–150)
ALT SERPL W P-5'-P-CCNC: 34 U/L (ref 0–70)
ANION GAP SERPL CALCULATED.3IONS-SCNC: 5 MMOL/L (ref 3–14)
AST SERPL W P-5'-P-CCNC: 15 U/L (ref 0–45)
BILIRUB SERPL-MCNC: 1 MG/DL (ref 0.2–1.3)
BUN SERPL-MCNC: 12 MG/DL (ref 7–30)
CALCIUM SERPL-MCNC: 9.2 MG/DL (ref 8.5–10.1)
CHLORIDE BLD-SCNC: 106 MMOL/L (ref 94–109)
CO2 SERPL-SCNC: 30 MMOL/L (ref 20–32)
CREAT SERPL-MCNC: 1.01 MG/DL (ref 0.66–1.25)
ERYTHROCYTE [DISTWIDTH] IN BLOOD BY AUTOMATED COUNT: 12.6 % (ref 10–15)
GFR SERPL CREATININE-BSD FRML MDRD: >90 ML/MIN/1.73M2
GLUCOSE BLD-MCNC: 98 MG/DL (ref 70–99)
HCT VFR BLD AUTO: 45.4 % (ref 40–53)
HGB BLD-MCNC: 14.8 G/DL (ref 13.3–17.7)
MCH RBC QN AUTO: 28.6 PG (ref 26.5–33)
MCHC RBC AUTO-ENTMCNC: 32.6 G/DL (ref 31.5–36.5)
MCV RBC AUTO: 88 FL (ref 78–100)
PLATELET # BLD AUTO: 263 10E3/UL (ref 150–450)
POTASSIUM BLD-SCNC: 3.8 MMOL/L (ref 3.4–5.3)
PROT SERPL-MCNC: 7.6 G/DL (ref 6.8–8.8)
RBC # BLD AUTO: 5.17 10E6/UL (ref 4.4–5.9)
SODIUM SERPL-SCNC: 141 MMOL/L (ref 133–144)
WBC # BLD AUTO: 6.1 10E3/UL (ref 4–11)

## 2022-06-08 PROCEDURE — 36415 COLL VENOUS BLD VENIPUNCTURE: CPT | Performed by: PHYSICIAN ASSISTANT

## 2022-06-08 PROCEDURE — 80053 COMPREHEN METABOLIC PANEL: CPT | Performed by: PHYSICIAN ASSISTANT

## 2022-06-08 PROCEDURE — 99204 OFFICE O/P NEW MOD 45 MIN: CPT | Performed by: PHYSICIAN ASSISTANT

## 2022-06-08 PROCEDURE — 85027 COMPLETE CBC AUTOMATED: CPT | Performed by: PHYSICIAN ASSISTANT

## 2022-06-08 PROCEDURE — 73130 X-RAY EXAM OF HAND: CPT | Mod: TC | Performed by: RADIOLOGY

## 2022-06-08 RX ORDER — FOLIC ACID 1 MG/1
1 TABLET ORAL DAILY
Qty: 90 TABLET | Refills: 3 | Status: SHIPPED | OUTPATIENT
Start: 2022-06-08 | End: 2022-10-25

## 2022-06-08 RX ORDER — PREDNISONE 5 MG/1
TABLET ORAL
Qty: 32 TABLET | Refills: 0 | Status: SHIPPED | OUTPATIENT
Start: 2022-06-08 | End: 2022-07-19

## 2022-06-08 NOTE — PATIENT INSTRUCTIONS
After Visit Instructions:     Thank you for coming to [facility] for your care. It is my goal to partner with you to help you reach your optimal state of health.       Plan:     Schedule follow-up with Leonor Awad PA-C in 1 month.   Imaging: xray hands and wrists  Labs: CBC and CMP today, CBC, creatinine, Albumin and AST/ALT in 1 month (prior to next visit)  Medication recommendations:   Methotrexate: Will start you on 10mg (4 tablets) WEEKLY for 1 week (you can take all 4 tablets at once), then increase to 15mg (6 tablets) weekly (for this dose, I want you to take 3 tablets in the AM and 3 tablets in the evening). While on Methotrexate:  -check labs (ALT/AST, albumin, CBC with platelets and creatinine) monthly  -Limit alcohol to 2 drinks weekly  -Take Folic Acid 1mg daily   -Tylenol 500-1000mg can be used as needed up to three times daily for nausea/headache associated with dosing  Do not use NSAIDs such as Ibuprofen/Advil/Aleve   Prednisone 5mg:  Take 10mg (2 tablets) daily for 1 week, then take 7.5mg (1.5 tablets) daily for 1 week, then take 5mg (1 tablet) daily for 1 week then stop. Take with food      Leonor Awad PA-C  [facility]  [facility]    Contact information: [facility]  Clinic Number:  [phone]  Please call or send a Jiglu message with any questions about your care

## 2022-06-15 ENCOUNTER — NURSE TRIAGE (OUTPATIENT)
Dept: FAMILY MEDICINE | Facility: CLINIC | Age: 37
End: 2022-06-15

## 2022-06-15 ENCOUNTER — MYC MEDICAL ADVICE (OUTPATIENT)
Dept: FAMILY MEDICINE | Facility: CLINIC | Age: 37
End: 2022-06-15
Payer: COMMERCIAL

## 2022-06-15 ENCOUNTER — MYC MEDICAL ADVICE (OUTPATIENT)
Dept: RHEUMATOLOGY | Facility: CLINIC | Age: 37
End: 2022-06-15
Payer: COMMERCIAL

## 2022-06-15 NOTE — TELEPHONE ENCOUNTER
See [employer] message.    CC: to Primary Care Provider: Please note Rheumatology provider not in clinic until 22nd    Ofelia ELIZONDO   [facility]

## 2022-06-15 NOTE — TELEPHONE ENCOUNTER
"Pt called back, started new medication for RA last week. Pt had episode last night where he had vision problem. Documentation copied from [employer] message and entered here. \" I just wanted to touch base with you to let you know that for about 30 minutes, last night, I experienced a loss of vision of most of the left side of my field of vision. I noticed it as it started just to the left of what I was focusing on, but over about a half hour, it expanded to include much of the left hand side of my field of vision. I was getting my daughter ready for bed as it happened, and after I got her put down, I sat down to google whether or not this was something I should seek immediate attention for, and as I was doing that, it cleared up. I didn't know if this could be a side effect of any of the medications I was taking, but I thought I should let you know about it. Please let me know if there is anything I need to do. Thank you.\"  This RN talked to pt, he was given Dr Frazier's message about making an eye appt and  seeking care urgently if he had a re-occurrence of symptoms . He had a slight HA at the time but none now, he had no other neuro symptoms. Appt made  on Friday with Dr Frazier, he will go to ER with any return of symptoms or new worrisome symptoms.   Caitlin Wallace RN     "

## 2022-06-16 NOTE — TELEPHONE ENCOUNTER
Messaged reviewed. Agree with Dr. Frazier to be evaluated by eye clinic or urgently if recurs.    Leonor Awad PA-C on 6/16/2022 at 11:02 AM

## 2022-06-17 ENCOUNTER — OFFICE VISIT (OUTPATIENT)
Dept: FAMILY MEDICINE | Facility: CLINIC | Age: 37
End: 2022-06-17
Payer: COMMERCIAL

## 2022-06-17 VITALS
WEIGHT: 267.9 LBS | DIASTOLIC BLOOD PRESSURE: 84 MMHG | HEIGHT: 72 IN | HEART RATE: 89 BPM | RESPIRATION RATE: 20 BRPM | TEMPERATURE: 98.1 F | BODY MASS INDEX: 36.29 KG/M2 | OXYGEN SATURATION: 97 % | SYSTOLIC BLOOD PRESSURE: 136 MMHG

## 2022-06-17 DIAGNOSIS — G43.109 OCULAR MIGRAINE: Primary | ICD-10-CM

## 2022-06-17 PROCEDURE — 99213 OFFICE O/P EST LOW 20 MIN: CPT | Performed by: INTERNAL MEDICINE

## 2022-06-17 ASSESSMENT — PAIN SCALES - GENERAL: PAINLEVEL: NO PAIN (0)

## 2022-06-17 NOTE — PROGRESS NOTES
Assessment & Plan     Ocular migraine    Shady presents with an episodes of left sided visual disruption that was transient and associated with mild headache, no other neuro symptoms.  Eye and neuro exam was normal in clinic today.  Suspect likely ocular migraine.  He reports he has had some transient visual issues in the past as well with normal eye exam since, but still suggested he have a comprehensive eye exam after this recent episode.  He has not had migraine type headaches.  Discussed red flag symptoms to monitor for and call or present for evaluation if he develops any persistent vision issues or other neuro signs.      He deferred tetanus and COVID boosters for now.    24 minutes spent on the date of the encounter doing chart review, history and exam, documentation and further activities per the note      Fantasma Frazier MD  [facility]    Subjective   Shady is a 36 year old, presenting for the following health issues:  Distored Vision      History of Present Illness       Reason for visit:  Tunnel vision  Symptom onset:  1-3 days ago  : was reading books and his left eye starting having foggy/flashing dark spots. Over the next half hour it grew and became most of his left sided field of vision. He googled his problem and then it cleared up on it's own shortly after that.  Symptom intensity:  Moderate  Symptom progression:  Improving  Had these symptoms before:  Yes (a few years ago he lost his vision completely for a few minutes. Is unsure of which eye was affected at that time. Not having any headaches, balance issues or injury. Thinks it may be stress related)  What makes it worse:  None  What makes it better:  None    He eats 2-3 servings of fruits and vegetables daily.He consumes 1 sweetened beverage(s) daily.He exercises with enough effort to increase his heart rate 20 to 29 minutes per day.  He exercises with enough effort to increase his heart rate 4 days per week.   He is taking  medications regularly.    Shady reports that symptoms never affected the central vision.  He tried closing one eye to see which side was affected, but he wasn't sure since he still saw the weird things in the vision when both eyes were closed.  Symptoms resolved quickly.    He had kind of a mild tension headache that started before the eye symptom and when on the next day after having the vision issue. When he coughed the next day he had head pain as well. Unsure whether this is related or not.    He did not note any facial droop, speech or swallowing issues, limb weakness or numbness.      He had a couple of episodes in the past when one eye would go dark and that would be pretty brief, started a couple of years ago.  He has seen an eye doctor after that and had Lasik.  Saw eye doc last winter.  Doesn't have a history of migraine headaches.      He is not yet scheduled with an eye doctor.        Review of Systems   Constitutional, HEENT, neuro systems are negative, except as otherwise noted.      Objective    /84 (BP Location: Left arm, Patient Position: Sitting, Cuff Size: Adult Large)   Pulse 89   Temp 98.1  F (36.7  C) (Tympanic)   Resp 20   Ht 1.829 m (6')   Wt 121.5 kg (267 lb 14.4 oz)   SpO2 97%   BMI 36.33 kg/m    Body mass index is 36.33 kg/m .  Physical Exam     GENERAL: healthy, alert and no distress  RESP: lungs clear to auscultation - no rales, rhonchi or wheezes  CV: regular rate and rhythm, normal S1 S2, no S3 or S4, no murmur, click or rub, no peripheral edema  NEURO: Cranial nerves intact, normal strength and tone, sensory exam grossly normal, mentation intact, DTR's normal and symmetric at patellas, gait normal including heel/toe/tandem walking and Romberg normal, normal finger to nose and heel to shin tests                .  ..

## 2022-06-17 NOTE — PATIENT INSTRUCTIONS
Symptoms seem likely related to ocular migraine.  Please follow-up if you develop any more persistent vision issues.

## 2022-06-24 ENCOUNTER — MYC MEDICAL ADVICE (OUTPATIENT)
Dept: RHEUMATOLOGY | Facility: CLINIC | Age: 37
End: 2022-06-24

## 2022-07-07 ENCOUNTER — MYC MEDICAL ADVICE (OUTPATIENT)
Dept: RHEUMATOLOGY | Facility: CLINIC | Age: 37
End: 2022-07-07

## 2022-07-08 NOTE — TELEPHONE ENCOUNTER
Form completed and faxed to number listed on paperwork. Confirmed receipt/fax completed.  Patient informed of this via mychart.  Vick/MA  [facility]

## 2022-07-15 NOTE — PROGRESS NOTES
Rheumatology Clinic Visit  [facility]  TRESSA Downey     Shady Maguire MRN# [id number]   YOB: 1985 Age: 36 year old   Date of Visit: 07/15/2022  Primary care provider: Hilary Hammer [facility]          Assessment and Plan:     1.  Rheumatoid arthritis  2.  High-risk medication use  3.  Elevated rheumatoid factor  4.  CCP antibody positive    Patient presents today for follow-up on his rheumatoid arthritis.  He has started treatment with the methotrexate and states that he is tolerating it well.  He did have an episode of significant mouth sores after 1 dose of methotrexate, however it has not happened again.  He states that his joints are doing significantly better.  Physical examination was unremarkable.  Normal joint range of motion.    Patient is doing better since initiating immunomodulatory medication with methotrexate.  Overall tolerating it well.  He did have an episode of significant mouth sores.  Would recommend that he increase his folic acid to 2 mg daily to prevent this from happening again.  If it does happen again he will let me know and we can provide him with some Magic mouthwash.  We will otherwise continue routine follow-up.  We will get monthly labs x2 and if those continue to be normal can change to every 3 months.      Plan:     1. Schedule follow-up with Leonor Awad PA-C in 3 months.   2. Imaging: none  3. Labs:  CBC, creatinine, Albumin and AST/ALT today and in 1 month   4. Medication recommendations:   a. Methotrexate: Will continue 15mg (6 tablets) WEEKLY, 3 tablets in the AM and 3 tablets in the evening. While on Methotrexate:  b. -check labs (ALT/AST, albumin, CBC with platelets and creatinine) monthly  c. -Limit alcohol to 2 drinks weekly  d. -Increase Folic Acid 2mg daily   e. -Tylenol 500-1000mg can be used as needed up to three times daily for nausea/headache associated with dosing      TRESSA Downey  Rheumatology         History of Present  "Illness:   Shady Maguire presents for evaluation of polyarthritis.      Interval history 7/19/2022:    He states that his joints have been pretty good. He states that he has had a couple mild episodes of joint pain, but nothing compared to what they were before. The knee is doing well also. No morning stiffness. He takes the Methotrexate on Thursdays. He did not have any side effects from the Methotrexate for the first couple weeks, but after he finished the prednisone, when he took the methotrexate, on Friday or Saturday he had multiple mouth sores. This caused him to not want to eat food, he did, but there was decreased motivation. There was pain with sores. He is taking Folic Acid daily. This happened 2 thursdays ago. When he took the methotrexate again, it did not happen again.     Consult visit from 6/8/2022:  He has been keeping track of pain that has started and where it has been. This initiaed after having knee pain that was severe. Xray was normal. 2 days later he was unable lift his left arm up. He then had blood work done and put together a list. He has had pain in multiple joints including right wrist, left knee, left elbow, multiple fingers, left wrist, hips, shoulders, knees and toes. In these joints he has had sharp pains with movements, some pain even with rest. Difficulty with gripping. The pains have lasted anywhere from 1/2 day to 3+ days. He started prednisone. He states that seemed to help awhile. He started it on 5/9/2022. Before that every other day something new would start. He had bout 1.5 weeks with no pain, missed a couple prednisone doses and got back on it. Since then, it is back to every couple days. He has not had any morning stiffness. He can usually feel things starting the night before. He can feel a pain starting and the next morning he has the pain in that joint. Over the last couple years he \"threw\" his back out twice. It has not been back to 100% since then. He is unsure if he has " had joint swelling. He has asked his wife as well, but they have not noticed significant swelling. No fatigue. No shortness of breath, coughing or chest pain. No skin rashes or mouth sores. History of smoking, limited now.     No family history of RA or lupus. He has a brother with crohn's. No personal history of psoriasis, ulcerative colitis or crohn's.          Review of Systems:     Constitutional: negative  Skin: negative  Eyes: negative  Ears/Nose/Throat: negative  Respiratory: No shortness of breath, dyspnea on exertion, cough, or hemoptysis  Cardiovascular: negative  Gastrointestinal: negative  Genitourinary: negative  Musculoskeletal: As above  Neurologic: negative  Psychiatric: negative  Hematologic/Lymphatic/Immunologic: negative  Endocrine: negative         Active Problem List:     Patient Active Problem List    Diagnosis Date Noted     Acute midline low back pain without sciatica 10/14/2019     Priority: Medium     Obesity (BMI 35.0-39.9) with comorbidity (H) 10/14/2019     Priority: Medium            Past Medical History:   No past medical history on file.  No past surgical history on file.         Social History:     Social History     Socioeconomic History     Marital status:      Spouse name: Not on file     Number of children: Not on file     Years of education: Not on file     Highest education level: Not on file   Occupational History     Not on file   Tobacco Use     Smoking status: Former Smoker     Packs/day: 0.00     Types: Cigarettes     Quit date: 2010     Years since quittin.5     Smokeless tobacco: Former User     Types: Chew     Tobacco comment: Smoked for a year while in college-only a social smoker   Vaping Use     Vaping Use: Never used   Substance and Sexual Activity     Alcohol use: Not Currently     Comment: occ     Drug use: Not Currently     Sexual activity: Yes     Partners: Female   Other Topics Concern     Not on file   Social History Narrative      2022    ENVIRONMENTAL HISTORY: [place] The home is heated with a forced air. They do have central air conditioning. The patient's bedroom is furnished with hard malinda in bedroom.  No pets. There is no history of cockroach or mice infestation. There is/are 0 smokers in the house.  The house does not have a damp basement. Noticed mold growth on basement window ki.     Social Determinants of Health     Financial Resource Strain: Not on file   Food Insecurity: Not on file   Transportation Needs: Not on file   Physical Activity: Not on file   Stress: Not on file   Social Connections: Not on file   Intimate Partner Violence: Not on file   Housing Stability: Not on file          Family History:     Family History   Problem Relation Age of Onset     Hypertension Father      Allergies Brother         Anesthesia     Allergies Brother         Bees     Crohn's Disease Brother      Heart Disease Maternal Grandmother      Cancer Maternal Grandfather         skin ca     Heart Disease Maternal Grandfather      Heart Disease Paternal Grandmother      Heart Disease Paternal Grandfather             Allergies:   No Known Allergies         Medications:     Current Outpatient Medications   Medication Sig Dispense Refill     budesonide (PULMICORT) 0.5 MG/2ML neb solution Place 0.5 mg/2 mL budesonide vial in 8 oz normal saline sinus rinse bottle.  Irrigate each nostril with one half of the bottle twice daily. (Patient not taking: No sig reported) 360 mL 3     folic acid (FOLVITE) 1 MG tablet Take 1 tablet (1 mg) by mouth daily 90 tablet 3     ibuprofen (ADVIL/MOTRIN) 200 MG tablet Take 200 mg by mouth (Patient not taking: Reported on 6/17/2022)       methocarbamol (ROBAXIN) 750 MG tablet 1 tab by mouth every 4 hrs. or 2 tabs every 8 hrs. as needed for muscle spasm. (Patient not taking: No sig reported) 30 tablet 1     methotrexate 2.5 MG tablet Take 6 tablets (15 mg) by mouth every 7  days Labs monthly. 72 tablet 0     olopatadine (PATADAY) 0.2 % ophthalmic solution Place 1 drop into both eyes daily Using prn       predniSONE (DELTASONE) 5 MG tablet Take 10mg (2 tablets) daily for 1 week, then take 7.5mg (1.5 tablets) daily for 1 week, then take 5mg (1 tablet) daily for 1 week then stop. Take with food. 32 tablet 0            Physical Exam:   There were no vitals taken for this visit.  Wt Readings from Last 6 Encounters:   06/17/22 121.5 kg (267 lb 14.4 oz)   06/08/22 123.8 kg (273 lb)   04/14/22 123.4 kg (272 lb)   02/16/22 119.7 kg (264 lb)   01/28/22 120.1 kg (264 lb 12.4 oz)   01/27/22 120.1 kg (264 lb 12.4 oz)     Constitutional: well-developed, appearing stated age; cooperative  Eyes: nl conjunctiva, sclera  ENT: nl external ears, hearing  Neck: no mass or thyroid enlargement  Resp: No shortness of breath with normal conversation  Lymph: no cervical, supraclavicular or epitrochlear nodes  MS: The TMJ, elbow, wrist, MCP/PIP/DIP, knee, joints were examined and found normal. No active synovitis or altered joint anatomy. Full joint ROM.  No dactylitis.   Skin: no nail pitting, alopecia, rash, nodules or lesions.   Neuro: nl cranial nerves.   Psych: nl judgement, orientation, memory, affect.           Data:   Imaging:  Xray left knee 4/14/2022  Impression:     1.  Normal left knee. Normal joint alignment and spacing. No fracture  or bone lesion. No significant joint effusion.    6/8/2022 x-ray bilateral hands and wrists  IMPRESSION: No erosion or joint space narrowing. No soft tissue  swelling. Normal mineralization. Normal alignment.    Laboratory:  4/22/2022  CRP <2.9    Rheumatoid factor 31  Sed rate 7  Lyme 0.71  TIMOTHY negative    6/8/2022  Creatinine 1.01, GFR greater than 90  ALT 34, AST 15  CBC within normal limits

## 2022-07-19 ENCOUNTER — OFFICE VISIT (OUTPATIENT)
Dept: RHEUMATOLOGY | Facility: CLINIC | Age: 37
End: 2022-07-19
Payer: COMMERCIAL

## 2022-07-19 VITALS
DIASTOLIC BLOOD PRESSURE: 80 MMHG | BODY MASS INDEX: 36.16 KG/M2 | HEIGHT: 72 IN | SYSTOLIC BLOOD PRESSURE: 128 MMHG | WEIGHT: 267 LBS

## 2022-07-19 DIAGNOSIS — R76.8 ELEVATED RHEUMATOID FACTOR: ICD-10-CM

## 2022-07-19 DIAGNOSIS — M06.9 RHEUMATOID ARTHRITIS INVOLVING MULTIPLE JOINTS (H): Primary | ICD-10-CM

## 2022-07-19 DIAGNOSIS — R76.8 CYCLIC CITRULLINATED PEPTIDE (CCP) ANTIBODY POSITIVE: ICD-10-CM

## 2022-07-19 DIAGNOSIS — Z79.899 HIGH RISK MEDICATION USE: ICD-10-CM

## 2022-07-19 LAB
ALBUMIN SERPL-MCNC: 4.2 G/DL (ref 3.4–5)
ALT SERPL W P-5'-P-CCNC: 47 U/L (ref 0–70)
AST SERPL W P-5'-P-CCNC: 22 U/L (ref 0–45)
CREAT SERPL-MCNC: 0.89 MG/DL (ref 0.66–1.25)
ERYTHROCYTE [DISTWIDTH] IN BLOOD BY AUTOMATED COUNT: 13.3 % (ref 10–15)
GFR SERPL CREATININE-BSD FRML MDRD: >90 ML/MIN/1.73M2
HCT VFR BLD AUTO: 43.6 % (ref 40–53)
HGB BLD-MCNC: 14.6 G/DL (ref 13.3–17.7)
MCH RBC QN AUTO: 28.7 PG (ref 26.5–33)
MCHC RBC AUTO-ENTMCNC: 33.5 G/DL (ref 31.5–36.5)
MCV RBC AUTO: 86 FL (ref 78–100)
PLATELET # BLD AUTO: 298 10E3/UL (ref 150–450)
RBC # BLD AUTO: 5.08 10E6/UL (ref 4.4–5.9)
WBC # BLD AUTO: 5.9 10E3/UL (ref 4–11)

## 2022-07-19 PROCEDURE — 85027 COMPLETE CBC AUTOMATED: CPT | Performed by: PHYSICIAN ASSISTANT

## 2022-07-19 PROCEDURE — 84450 TRANSFERASE (AST) (SGOT): CPT | Performed by: PHYSICIAN ASSISTANT

## 2022-07-19 PROCEDURE — 82565 ASSAY OF CREATININE: CPT | Performed by: PHYSICIAN ASSISTANT

## 2022-07-19 PROCEDURE — 84460 ALANINE AMINO (ALT) (SGPT): CPT | Performed by: PHYSICIAN ASSISTANT

## 2022-07-19 PROCEDURE — 99214 OFFICE O/P EST MOD 30 MIN: CPT | Performed by: PHYSICIAN ASSISTANT

## 2022-07-19 PROCEDURE — 36415 COLL VENOUS BLD VENIPUNCTURE: CPT | Performed by: PHYSICIAN ASSISTANT

## 2022-07-19 PROCEDURE — 82040 ASSAY OF SERUM ALBUMIN: CPT | Performed by: PHYSICIAN ASSISTANT

## 2022-07-19 NOTE — PATIENT INSTRUCTIONS
After Visit Instructions:     Thank you for coming to [facility] for your care. It is my goal to partner with you to help you reach your optimal state of health.     Plan:     Schedule follow-up with Leonor Awad PA-C in 3 months.   Labs: creatinine, albumin, AST, ALT, CBC monthly x2  Medication recommendations:   Methotrexate: Will continue you on 15mg (6 tablets) WEEKLY. While on Methotrexate:  -check labs (ALT/AST, albumin, CBC with platelets and creatinine) monthly x2  -Limit alcohol to 2 drinks weekly  -Increase Folic Acid 2mg daily   -Tylenol 500-1000mg can be used as needed up to three times daily for nausea/headache associated with dosing        Leonor Awad PA-C  [facility]  [facility]    Contact information: [facility]  Clinic Number:  [phone]  Please call or send a [employer] message with any questions about your care

## 2022-08-19 ENCOUNTER — LAB (OUTPATIENT)
Dept: LAB | Facility: CLINIC | Age: 37
End: 2022-08-19
Payer: COMMERCIAL

## 2022-08-19 DIAGNOSIS — M06.9 RHEUMATOID ARTHRITIS INVOLVING MULTIPLE JOINTS (H): ICD-10-CM

## 2022-08-19 DIAGNOSIS — Z79.899 HIGH RISK MEDICATION USE: ICD-10-CM

## 2022-08-19 LAB
ALBUMIN SERPL-MCNC: 4.2 G/DL (ref 3.4–5)
ALT SERPL W P-5'-P-CCNC: 50 U/L (ref 0–70)
AST SERPL W P-5'-P-CCNC: 28 U/L (ref 0–45)
CREAT SERPL-MCNC: 0.9 MG/DL (ref 0.66–1.25)
ERYTHROCYTE [DISTWIDTH] IN BLOOD BY AUTOMATED COUNT: 14.1 % (ref 10–15)
GFR SERPL CREATININE-BSD FRML MDRD: >90 ML/MIN/1.73M2
HCT VFR BLD AUTO: 43.8 % (ref 40–53)
HGB BLD-MCNC: 14.7 G/DL (ref 13.3–17.7)
MCH RBC QN AUTO: 29.2 PG (ref 26.5–33)
MCHC RBC AUTO-ENTMCNC: 33.6 G/DL (ref 31.5–36.5)
MCV RBC AUTO: 87 FL (ref 78–100)
PLATELET # BLD AUTO: 268 10E3/UL (ref 150–450)
RBC # BLD AUTO: 5.04 10E6/UL (ref 4.4–5.9)
WBC # BLD AUTO: 5.5 10E3/UL (ref 4–11)

## 2022-08-19 PROCEDURE — 82040 ASSAY OF SERUM ALBUMIN: CPT

## 2022-08-19 PROCEDURE — 82565 ASSAY OF CREATININE: CPT

## 2022-08-19 PROCEDURE — 84460 ALANINE AMINO (ALT) (SGPT): CPT

## 2022-08-19 PROCEDURE — 84450 TRANSFERASE (AST) (SGOT): CPT

## 2022-08-19 PROCEDURE — 36415 COLL VENOUS BLD VENIPUNCTURE: CPT

## 2022-08-19 PROCEDURE — 85027 COMPLETE CBC AUTOMATED: CPT

## 2022-09-18 ENCOUNTER — HEALTH MAINTENANCE LETTER (OUTPATIENT)
Age: 37
End: 2022-09-18

## 2022-10-25 ENCOUNTER — OFFICE VISIT (OUTPATIENT)
Dept: RHEUMATOLOGY | Facility: CLINIC | Age: 37
End: 2022-10-25
Payer: COMMERCIAL

## 2022-10-25 VITALS
WEIGHT: 267 LBS | SYSTOLIC BLOOD PRESSURE: 130 MMHG | BODY MASS INDEX: 36.16 KG/M2 | DIASTOLIC BLOOD PRESSURE: 92 MMHG | HEIGHT: 72 IN

## 2022-10-25 DIAGNOSIS — Z79.899 HIGH RISK MEDICATION USE: ICD-10-CM

## 2022-10-25 DIAGNOSIS — K12.30 MUCOSITIS: ICD-10-CM

## 2022-10-25 DIAGNOSIS — R76.8 ELEVATED RHEUMATOID FACTOR: ICD-10-CM

## 2022-10-25 DIAGNOSIS — R76.8 CYCLIC CITRULLINATED PEPTIDE (CCP) ANTIBODY POSITIVE: ICD-10-CM

## 2022-10-25 DIAGNOSIS — M06.9 RHEUMATOID ARTHRITIS INVOLVING MULTIPLE JOINTS (H): Primary | ICD-10-CM

## 2022-10-25 LAB
ALBUMIN SERPL BCG-MCNC: 4.7 G/DL (ref 3.5–5.2)
ALT SERPL W P-5'-P-CCNC: 44 U/L (ref 10–50)
AST SERPL W P-5'-P-CCNC: 30 U/L (ref 10–50)
CREAT SERPL-MCNC: 1.03 MG/DL (ref 0.67–1.17)
ERYTHROCYTE [DISTWIDTH] IN BLOOD BY AUTOMATED COUNT: 12.6 % (ref 10–15)
GFR SERPL CREATININE-BSD FRML MDRD: >90 ML/MIN/1.73M2
HCT VFR BLD AUTO: 44 % (ref 40–53)
HGB BLD-MCNC: 14.7 G/DL (ref 13.3–17.7)
MCH RBC QN AUTO: 29.4 PG (ref 26.5–33)
MCHC RBC AUTO-ENTMCNC: 33.4 G/DL (ref 31.5–36.5)
MCV RBC AUTO: 88 FL (ref 78–100)
PLATELET # BLD AUTO: 269 10E3/UL (ref 150–450)
RBC # BLD AUTO: 5 10E6/UL (ref 4.4–5.9)
WBC # BLD AUTO: 5.4 10E3/UL (ref 4–11)

## 2022-10-25 PROCEDURE — 85027 COMPLETE CBC AUTOMATED: CPT | Performed by: PHYSICIAN ASSISTANT

## 2022-10-25 PROCEDURE — 36415 COLL VENOUS BLD VENIPUNCTURE: CPT | Performed by: PHYSICIAN ASSISTANT

## 2022-10-25 PROCEDURE — 84450 TRANSFERASE (AST) (SGOT): CPT | Performed by: PHYSICIAN ASSISTANT

## 2022-10-25 PROCEDURE — 99214 OFFICE O/P EST MOD 30 MIN: CPT | Performed by: PHYSICIAN ASSISTANT

## 2022-10-25 PROCEDURE — 82040 ASSAY OF SERUM ALBUMIN: CPT | Performed by: PHYSICIAN ASSISTANT

## 2022-10-25 PROCEDURE — 84460 ALANINE AMINO (ALT) (SGPT): CPT | Performed by: PHYSICIAN ASSISTANT

## 2022-10-25 PROCEDURE — 82565 ASSAY OF CREATININE: CPT | Performed by: PHYSICIAN ASSISTANT

## 2022-10-25 RX ORDER — FOLIC ACID 1 MG/1
2 TABLET ORAL DAILY
Qty: 180 TABLET | Refills: 3 | Status: SHIPPED | OUTPATIENT
Start: 2022-10-25 | End: 2023-11-03

## 2022-10-25 NOTE — PATIENT INSTRUCTIONS
After Visit Instructions:     Thank you for coming to [facility] for your care. It is my goal to partner with you to help you reach your optimal state of health.       Plan:     Schedule follow-up with Leonor Awad PA-C in 3 months.   Labs: CBC, creatinine, AST, ALT and albumin every 3 months, will do this today then will be due again in January  Medication recommendations:   Methotrexate: Continue 10mg (4 tablets) WEEKLY. While on Methotrexate:  -check labs (ALT/AST, albumin, CBC with platelets and creatinine) every 3 months  -Limit alcohol to 2 drinks weekly  -Take Folic Acid 2mg daily   -Tylenol 500-1000mg can be used as needed up to three times daily for nausea/headache associated with dosing        Leonor Awad PA-C  [facility]  [facility]    Contact information: [facility]  Clinic Number:  [phone]  Please call or send a [employer] message with any questions about your care

## 2022-10-25 NOTE — PROGRESS NOTES
Rheumatology Clinic Visit  [facility]  TRESSA Downey     Shady Maguire MRN# [id number]   YOB: 1985 Age: 37 year old   Date of Visit: 10/25/2022  Primary care provider: Hilary Hammer [facility]          Assessment and Plan:     1.  Rheumatoid arthritis  2.  High-risk medication use  3.  Elevated rheumatoid factor  4.  CCP antibody positive  5.  Mucositis    Patient presents today for follow-up regarding his seropositive rheumatoid arthritis.  He states that his joints have been doing really well.  He has not had any issues with morning stiffness in his joints either.  He did decrease the dose of the methotrexate to 4 tablets weekly and has been tolerating that well.  He did have 1 episode of mucositis decreasing the dose but states that this was manageable.  He does have Magic mouthwash at home if needed but has not needed to use it.  He did also increase the folic acid to 2 mg.  Physical examination was unremarkable.  He has normal joint range of motion.  No active synovitis.    Will continue immunomodulatory medication given the significant benefit in his joints that he has had since initiating treatment with it.  We will continue to monitor his labs for any medication toxicity.  He will also continue to monitor for any mucositis associated with the methotrexate.      Plan:     1. Schedule follow-up with Leonor Awad PA-C in 3 months.   2. Labs: CBC, creatinine, AST, ALT and albumin every 3 months, will do this today then will be due again in January  3. Medication recommendations:   a. Methotrexate: Continue 10mg (4 tablets) WEEKLY. While on Methotrexate:  b. -check labs (ALT/AST, albumin, CBC with platelets and creatinine) every 3 months  c. -Limit alcohol to 2 drinks weekly  d. -Take Folic Acid 2mg daily   e. -Tylenol 500-1000mg can be used as needed up to three times daily for nausea/headache associated with dosing    TRESSA Downey  Rheumatology         History of  Present Illness:   Shady Maguire presents for evaluation of polyarthritis.      Interval history October 25, 2022:  His joints have been doing well. He has not had trouble with morning stiffness in his joints. He is tolerating the methotrexate. He takes his 4 tablets on Thursdays. He has been doing Folic Acid, 2mg. The mouth sores have decreased. He has not needed to use the Magic Mouthwash.     Interval history 7/19/2022:  He states that his joints have been pretty good. He states that he has had a couple mild episodes of joint pain, but nothing compared to what they were before. The knee is doing well also. No morning stiffness. He takes the Methotrexate on Thursdays. He did not have any side effects from the Methotrexate for the first couple weeks, but after he finished the prednisone, when he took the methotrexate, on Friday or Saturday he had multiple mouth sores. This caused him to not want to eat food, he did, but there was decreased motivation. There was pain with sores. He is taking Folic Acid daily. This happened 2 thursdays ago. When he took the methotrexate again, it did not happen again.     Consult visit from 6/8/2022:  He has been keeping track of pain that has started and where it has been. This initiaed after having knee pain that was severe. Xray was normal. 2 days later he was unable lift his left arm up. He then had blood work done and put together a list. He has had pain in multiple joints including right wrist, left knee, left elbow, multiple fingers, left wrist, hips, shoulders, knees and toes. In these joints he has had sharp pains with movements, some pain even with rest. Difficulty with gripping. The pains have lasted anywhere from 1/2 day to 3+ days. He started prednisone. He states that seemed to help awhile. He started it on 5/9/2022. Before that every other day something new would start. He had bout 1.5 weeks with no pain, missed a couple prednisone doses and got back on it. Since then,  "it is back to every couple days. He has not had any morning stiffness. He can usually feel things starting the night before. He can feel a pain starting and the next morning he has the pain in that joint. Over the last couple years he \"threw\" his back out twice. It has not been back to 100% since then. He is unsure if he has had joint swelling. He has asked his wife as well, but they have not noticed significant swelling. No fatigue. No shortness of breath, coughing or chest pain. No skin rashes or mouth sores. History of smoking, limited now.     No family history of RA or lupus. He has a brother with crohn's. No personal history of psoriasis, ulcerative colitis or crohn's.          Review of Systems:     Constitutional: negative  Skin: negative  Eyes: negative  Ears/Nose/Throat: negative  Respiratory: No shortness of breath, dyspnea on exertion, cough, or hemoptysis  Cardiovascular: negative  Gastrointestinal: negative  Genitourinary: negative  Musculoskeletal: As above  Neurologic: negative  Psychiatric: negative  Hematologic/Lymphatic/Immunologic: negative  Endocrine: negative         Active Problem List:     Patient Active Problem List    Diagnosis Date Noted     Acute midline low back pain without sciatica 10/14/2019     Priority: Medium     Obesity (BMI 35.0-39.9) with comorbidity (H) 10/14/2019     Priority: Medium            Past Medical History:   No past medical history on file.  No past surgical history on file.         Social History:     Social History     Socioeconomic History     Marital status:      Spouse name: Not on file     Number of children: Not on file     Years of education: Not on file     Highest education level: Not on file   Occupational History     Not on file   Tobacco Use     Smoking status: Former     Packs/day: 0.00     Types: Cigarettes     Quit date: 2010     Years since quittin.8     Smokeless tobacco: Former     Types: Chew     Tobacco comments:     Smoked for a " year while in college-only a social smoker   Vaping Use     Vaping Use: Never used   Substance and Sexual Activity     Alcohol use: Not Currently     Comment: occ     Drug use: Not Currently     Sexual activity: Yes     Partners: Female   Other Topics Concern     Not on file   Social History Narrative    January 28, 2022    ENVIRONMENTAL HISTORY: [place] The home is heated with a forced air. They do have central air conditioning. The patient's bedroom is furnished with hard malinda in bedroom.  No pets. There is no history of cockroach or mice infestation. There is/are 0 smokers in the house.  The house does not have a damp basement. Noticed mold growth on basement window ki.     Social Determinants of Health     Financial Resource Strain: Not on file   Food Insecurity: Not on file   Transportation Needs: Not on file   Physical Activity: Not on file   Stress: Not on file   Social Connections: Not on file   Intimate Partner Violence: Not on file   Housing Stability: Not on file          Family History:     Family History   Problem Relation Age of Onset     Hypertension Father      Allergies Brother         Anesthesia     Allergies Brother         Bees     Crohn's Disease Brother      Heart Disease Maternal Grandmother      Cancer Maternal Grandfather         skin ca     Heart Disease Maternal Grandfather      Heart Disease Paternal Grandmother      Heart Disease Paternal Grandfather             Allergies:   No Known Allergies         Medications:     Current Outpatient Medications   Medication Sig Dispense Refill     budesonide (PULMICORT) 0.5 MG/2ML neb solution Place 0.5 mg/2 mL budesonide vial in 8 oz normal saline sinus rinse bottle.  Irrigate each nostril with one half of the bottle twice daily. (Patient not taking: No sig reported) 360 mL 3     folic acid (FOLVITE) 1 MG tablet Take 1 tablet (1 mg) by mouth daily 90 tablet 3     ibuprofen (ADVIL/MOTRIN) 200 MG  tablet Take 200 mg by mouth (Patient not taking: No sig reported)       magic mouthwash suspension, diphenhydrAMINE, lidocaine, aluminum-magnesium & simethicone, (FIRST-MOUTHWASH BLM) compounding kit Swish and swallow 5-10 mLs in mouth every 6 hours as needed for mouth sores 100 mL 0     methocarbamol (ROBAXIN) 750 MG tablet 1 tab by mouth every 4 hrs. or 2 tabs every 8 hrs. as needed for muscle spasm. (Patient not taking: No sig reported) 30 tablet 1     methotrexate 2.5 MG tablet Take 6 tablets (15 mg) by mouth every 7 days Labs monthly. 72 tablet 0     olopatadine (PATADAY) 0.2 % ophthalmic solution Place 1 drop into both eyes daily Using prn              Physical Exam:   There were no vitals taken for this visit.  Wt Readings from Last 6 Encounters:   07/19/22 121.1 kg (267 lb)   06/17/22 121.5 kg (267 lb 14.4 oz)   06/08/22 123.8 kg (273 lb)   04/14/22 123.4 kg (272 lb)   02/16/22 119.7 kg (264 lb)   01/28/22 120.1 kg (264 lb 12.4 oz)     Constitutional: well-developed, appearing stated age; cooperative  Eyes: nl conjunctiva, sclera  ENT: nl external ears, hearing  Neck: no mass or thyroid enlargement  Resp: No shortness of breath with normal conversation  Lymph: no cervical, supraclavicular or epitrochlear nodes  MS: The TMJ, elbow, wrist, MCP/PIP/DIP, knee, joints were examined and found normal. No active synovitis or altered joint anatomy. Full joint ROM.  No dactylitis.   Skin: no nail pitting, alopecia, rash, nodules or lesions.   Neuro: nl cranial nerves.   Psych: nl judgement, orientation, memory, affect.           Data:   Imaging:  Xray left knee 4/14/2022  Impression:     1.  Normal left knee. Normal joint alignment and spacing. No fracture  or bone lesion. No significant joint effusion.    6/8/2022 x-ray bilateral hands and wrists  IMPRESSION: No erosion or joint space narrowing. No soft tissue  swelling. Normal mineralization. Normal alignment.    Laboratory:  4/22/2022  CRP <2.9    Rheumatoid  factor 31  Sed rate 7  Lyme 0.71  TIMOTHY negative    6/8/2022  Creatinine 1.01, GFR greater than 90  ALT 34, AST 15  CBC within normal limits    7/19/2022  Creatinine 0.89, GFR greater than 90  Albumin 4.2  ALT 47, AST 22  CBC within normal limits    8/19/2022   CBC within normal limits  Creatinine 0.90, GFR greater than 90  Albumin 4.2  ALT 50, AST 28

## 2022-11-28 DIAGNOSIS — M06.9 RHEUMATOID ARTHRITIS INVOLVING MULTIPLE JOINTS (H): ICD-10-CM

## 2022-11-28 NOTE — TELEPHONE ENCOUNTER
Last office visit: 10/25/2022  Next office visit: Needs to be scheduled  Last filled: 09/14/2022    Angelina Whitley MA, RMA  11/28/2022 4:28 PM

## 2022-12-01 NOTE — TELEPHONE ENCOUNTER
Methotrexate refilled. Please remind patient to schedule follow up with me around the end of January.    Leonor Awad PA-C on 12/1/2022 at 10:47 AM

## 2023-01-29 ENCOUNTER — HEALTH MAINTENANCE LETTER (OUTPATIENT)
Age: 38
End: 2023-01-29

## 2023-03-01 ENCOUNTER — MYC MEDICAL ADVICE (OUTPATIENT)
Dept: RHEUMATOLOGY | Facility: CLINIC | Age: 38
End: 2023-03-01
Payer: COMMERCIAL

## 2023-03-06 NOTE — TELEPHONE ENCOUNTER
Please scan copy of work accomodation request into patient's chart and mail original to him.    Leonor Awad PA-C on 3/6/2023 at 8:32 AM

## 2023-03-07 NOTE — TELEPHONE ENCOUNTER
Advised patient to schedule a lab and f/u appointment with Leonor according to last OV note.    Carrie George RN on 3/7/2023 at 9:10 AM

## 2023-03-27 ENCOUNTER — LAB (OUTPATIENT)
Dept: LAB | Facility: CLINIC | Age: 38
End: 2023-03-27
Payer: COMMERCIAL

## 2023-03-27 DIAGNOSIS — Z79.899 HIGH RISK MEDICATION USE: ICD-10-CM

## 2023-03-27 DIAGNOSIS — M06.9 RHEUMATOID ARTHRITIS INVOLVING MULTIPLE JOINTS (H): ICD-10-CM

## 2023-03-27 DIAGNOSIS — R76.8 CYCLIC CITRULLINATED PEPTIDE (CCP) ANTIBODY POSITIVE: ICD-10-CM

## 2023-03-27 DIAGNOSIS — R76.8 ELEVATED RHEUMATOID FACTOR: ICD-10-CM

## 2023-03-27 LAB
ALBUMIN SERPL BCG-MCNC: 4.6 G/DL (ref 3.5–5.2)
ALT SERPL W P-5'-P-CCNC: 50 U/L (ref 10–50)
AST SERPL W P-5'-P-CCNC: 32 U/L (ref 10–50)
CREAT SERPL-MCNC: 1.01 MG/DL (ref 0.67–1.17)
ERYTHROCYTE [DISTWIDTH] IN BLOOD BY AUTOMATED COUNT: 13.2 % (ref 10–15)
GFR SERPL CREATININE-BSD FRML MDRD: >90 ML/MIN/1.73M2
HCT VFR BLD AUTO: 44.7 % (ref 40–53)
HGB BLD-MCNC: 14.8 G/DL (ref 13.3–17.7)
MCH RBC QN AUTO: 28.7 PG (ref 26.5–33)
MCHC RBC AUTO-ENTMCNC: 33.1 G/DL (ref 31.5–36.5)
MCV RBC AUTO: 87 FL (ref 78–100)
PLATELET # BLD AUTO: 263 10E3/UL (ref 150–450)
RBC # BLD AUTO: 5.15 10E6/UL (ref 4.4–5.9)
WBC # BLD AUTO: 5.9 10E3/UL (ref 4–11)

## 2023-03-27 PROCEDURE — 82565 ASSAY OF CREATININE: CPT

## 2023-03-27 PROCEDURE — 84460 ALANINE AMINO (ALT) (SGPT): CPT

## 2023-03-27 PROCEDURE — 85027 COMPLETE CBC AUTOMATED: CPT

## 2023-03-27 PROCEDURE — 36415 COLL VENOUS BLD VENIPUNCTURE: CPT

## 2023-03-27 PROCEDURE — 82040 ASSAY OF SERUM ALBUMIN: CPT

## 2023-03-27 PROCEDURE — 84450 TRANSFERASE (AST) (SGOT): CPT

## 2023-04-05 DIAGNOSIS — M06.9 RHEUMATOID ARTHRITIS INVOLVING MULTIPLE JOINTS (H): ICD-10-CM

## 2023-04-05 NOTE — TELEPHONE ENCOUNTER
1 month refill provided. Patient needs to schedule follow up visit. Okay to be virtual.    Leonor Awad, PAC

## 2023-04-05 NOTE — TELEPHONE ENCOUNTER
LF: 1/14/23  Qty: 72  Last Rheumatology Visit: 10/25/22   Next Scheduled Rheumatology Visit: none scheduled

## 2023-04-19 ENCOUNTER — ANCILLARY PROCEDURE (OUTPATIENT)
Dept: GENERAL RADIOLOGY | Facility: CLINIC | Age: 38
End: 2023-04-19
Payer: COMMERCIAL

## 2023-04-19 ENCOUNTER — OFFICE VISIT (OUTPATIENT)
Dept: URGENT CARE | Facility: URGENT CARE | Age: 38
End: 2023-04-19
Payer: COMMERCIAL

## 2023-04-19 VITALS
SYSTOLIC BLOOD PRESSURE: 160 MMHG | DIASTOLIC BLOOD PRESSURE: 97 MMHG | TEMPERATURE: 98.3 F | WEIGHT: 271 LBS | BODY MASS INDEX: 36.75 KG/M2 | HEART RATE: 80 BPM | OXYGEN SATURATION: 97 %

## 2023-04-19 DIAGNOSIS — J40 BRONCHITIS: Primary | ICD-10-CM

## 2023-04-19 DIAGNOSIS — J01.90 ACUTE BACTERIAL RHINOSINUSITIS: ICD-10-CM

## 2023-04-19 DIAGNOSIS — B96.89 ACUTE BACTERIAL RHINOSINUSITIS: ICD-10-CM

## 2023-04-19 LAB
BASOPHILS # BLD AUTO: 0 10E3/UL (ref 0–0.2)
BASOPHILS NFR BLD AUTO: 0 %
EOSINOPHIL # BLD AUTO: 0.1 10E3/UL (ref 0–0.7)
EOSINOPHIL NFR BLD AUTO: 1 %
ERYTHROCYTE [DISTWIDTH] IN BLOOD BY AUTOMATED COUNT: 13 % (ref 10–15)
HCT VFR BLD AUTO: 42.9 % (ref 40–53)
HGB BLD-MCNC: 14.5 G/DL (ref 13.3–17.7)
IMM GRANULOCYTES # BLD: 0 10E3/UL
IMM GRANULOCYTES NFR BLD: 0 %
LYMPHOCYTES # BLD AUTO: 1.6 10E3/UL (ref 0.8–5.3)
LYMPHOCYTES NFR BLD AUTO: 14 %
MCH RBC QN AUTO: 28.3 PG (ref 26.5–33)
MCHC RBC AUTO-ENTMCNC: 33.8 G/DL (ref 31.5–36.5)
MCV RBC AUTO: 84 FL (ref 78–100)
MONOCYTES # BLD AUTO: 0.9 10E3/UL (ref 0–1.3)
MONOCYTES NFR BLD AUTO: 8 %
NEUTROPHILS # BLD AUTO: 8.6 10E3/UL (ref 1.6–8.3)
NEUTROPHILS NFR BLD AUTO: 76 %
PLATELET # BLD AUTO: 315 10E3/UL (ref 150–450)
RBC # BLD AUTO: 5.12 10E6/UL (ref 4.4–5.9)
WBC # BLD AUTO: 11.2 10E3/UL (ref 4–11)

## 2023-04-19 PROCEDURE — 99214 OFFICE O/P EST MOD 30 MIN: CPT

## 2023-04-19 PROCEDURE — 85025 COMPLETE CBC W/AUTO DIFF WBC: CPT

## 2023-04-19 PROCEDURE — 36415 COLL VENOUS BLD VENIPUNCTURE: CPT

## 2023-04-19 PROCEDURE — 71046 X-RAY EXAM CHEST 2 VIEWS: CPT | Mod: TC | Performed by: RADIOLOGY

## 2023-04-19 RX ORDER — AZITHROMYCIN 250 MG/1
TABLET, FILM COATED ORAL
Qty: 6 TABLET | Refills: 0 | Status: SHIPPED | OUTPATIENT
Start: 2023-04-19 | End: 2023-04-24

## 2023-04-19 NOTE — PATIENT INSTRUCTIONS
Diagnosis: Bronchitis  possibly bacterial}   Today we did:  Chest xray no signs of pneumonia   Labs: slight elevated wbc count        Plan:   Bronchitis can last over two weeks   But your cough may persist for longer   If it appears to be bacterial antibiotic can help, however they are not without risk   Side effects of Antibiotic use:   - Stomach upset (nausea and vomiting) - take with food    - consider a probiotic to replace good bacteria in GI system (OTC probiotic pills or foods like yogurt with 'live cultures')   - Diarrhea    - antibiotics cause eradication of normal 'gut' kalpana causing diarrhea and       put you at risk of developing something called C.diff infection (infection of the large intestine)   - Takes 3-6 months to build back normal micro-biome and risk incomplete restoration     - fungal infections   - with eradication of good microorganisms, antibiotics can also increase risk of developing a    fungal infection        by disrupting the normal microbiome in many mucosal membranes areas      often seen in the mouth = thrush, or the vagina = yeast infection  - Skin reactions - red spotty rash that is not itchy or 'swollen' - common, not a true allergy    Can develop skin sloughing or true allergic reaction        - A true allergic reaction:   These are consistent with:   swelling in the face, mouth, throat,   having difficulty breathing  Skin reactions, including hives and itching, and flushed or pale skin.   Low blood pressure   Constriction of your airways and a swollen tongue or throat, which can cause   wheezing and trouble breathing  A weak and rapid pulse,   nausea, vomiting  dizziness or fainting  - antibiotic medications reduces the thickness of colonic mucus, increasing risk of pathogen invasion and intestinal inflammation and increased permeability  = leaky gut syndrome    - taking antibiotics or not finishing the dose can promote bacterial resistance, or growth of bacterial Super-bugs          Other to do:   OTC cold medicines  Robitussin +D; day-Quil, nght-quil   Decongestants -Sudafed    REST   Drinking plenty of fluids,   such as water, juice, or warm soup.   Fluids thin mucus so that you can cough it up.   This helps you breathe more easily. Fluids also prevent dehydration.  Using a humidifier. This can help reduce coughing.  If you smoke, stop smoking! This include vaping     Monitor for:   Fever of 100.4 F ( 38.0 C) or higher, or as directed by your healthcare provider  Symptoms that get worse, or new symptoms  Symptoms that don't start to get better in 1 week  Trouble breathing that doesn't get better with treatment  Skin, lips, or nails that look blue, gray, or pale         Bronchitis  Bronchitis is infection or inflammation of the airways in the lungs (bronchial tubes). Normally, air moves easily in and out of the airways.   Bronchitis narrows the airways. This makes it harder for air to flow in and out of the lungs.   This causes symptoms, such as shortness of breath, coughing up yellow or green mucus, and wheezing.   Can also have: chest pain, fevers, feeling tired   Bronchitis can be acute or chronic.   Acute means it happens quickly and goes away in a short time, usually caused by a cold, flu or other virus   Most people with acute bronchitis get better in 1 to 2 weeks.   Chronic means a condition lasts a long time and often comes back, this is typically seen in people who were or are smokers  Acute bronchitis is usually caused by a virus, such as a cold or the flu.   In very rare cases, it may be caused by bacteria.   Certain factors make it more likely for a cold or flu to turn into bronchitis.   These include being very young, being elderly, having a heart or lung problem, or having a weak immune system.   Smoking also makes bronchitis more likely.  When bronchitis develops, the airways become swollen.   Later on (usually >2 weeks) the airways may also become infected with  bacteria. This is known as a secondary infection.      To diagnose bronchitis your provider will listen to your lungs while you breathe.   You may have a chest X-ray to look for (and rule out) a worse infection in the lungs (pneumonia) if you have had a fever.   You may also have a blood test or nose or throat swab to check for infection.

## 2023-04-19 NOTE — PROGRESS NOTES
URGENT CARE  Assessment & Plan   Assessment:   Shady Maguire is a 37 year old male who's clinical presentation today is consistent with:   1. Bronchitis  - XR Chest 2 Views; Future  - CBC with platelets and differential  - azithromycin (ZITHROMAX) 250 MG tablet; Take 2 tablets (500 mg    No alarm signs or symptoms present   Differential Diagnoses for this patient's CC include    Bacterial vs viral etiology of URI    Covid, influenza, pharyngitis, pneumonia, bronchitis,    Common cold, allergic rhinitis, seasonal allergies    ABRS, viral sinusitis, cough, pertussis,   Mononucleosis, tonsillitis, chronic sinusitis, meningococcal disease    Plan:  Will treat patient today for bronchitis supportively and symptomatically. Patient's lung sounds were slightly wheezy and cough noted as harsh but not productive, and chest xray was negative for pneumonia. Given patient has slight leukocytosis with neutrophilia today with treat bronchitis as bacterial with antibiotics; side effects of medications reviewed. Also encouraged patient to continue with OTC cold/flu medications and encouraged fluids and rest.   Additionally we discussed if symptoms do not improve after starting today's treatment (or if symptoms worsen) to follow up in 5-7 days.    Patient is agreeable to treatment plan and state they will follow-up if symptoms do not improve and/or if symptoms worsen (see patient's AVS 'monitor for' section for specific patient instructions given and discussed regarding what to watch for and when to follow up)    Medications ordered are listed above, please see AVS for patient's specific and personalized discharge instructions given     KYAW Blevins [facility] [place]      ______________________________________________________________________        Subjective  Subjective     HPI: Shady Maguire  is a 37 year old  male who presents today for evaluation the following concerns:   Patient  endorses cold  symptoms today which started 18 days ago on 4/1/23   Patient reports cough, congestion and eye pressure    Patient denies any fevers} sweats, chills, myalgias, wheezing, shortness of breath, difficulty breathing, chest pain, weakness or signs of dehydration   additionally patient denies a history of asthma or any other past medical history of breathing conditions}   Patient endorses his most bothersome symptom is his congestion and cough .        No Known Allergies  Patient Active Problem List   Diagnosis     Acute midline low back pain without sciatica     Obesity (BMI 35.0-39.9) with comorbidity (H)       Review of Systems:  Pertinent review of systems as reflected in HPI, otherwise negative.     Objective  Objective    Physical Exam:  Vitals:    04/19/23 1153   BP: (!) 160/97   Pulse: 80   Temp: 98.3  F (36.8  C)   TempSrc: Tympanic   SpO2: 97%   Weight: 122.9 kg (271 lb)      General: Alert and oriented, no acute distress, Vital signs reviewed: afebrile,  normotensive   Psy/mental status: Cooperative, nonanxious  SKIN: Intact, no rashes  EYES: EOMs intact, PERRLA bilaterally   Conjunctiva: Clear bilaterally, no injection or erythema present  EARS: TMs intact, translucent gray in color with normal landmarks present no erythema  or bulging tympanic membrane   Canals are without swelling, however have a mild amount of cerumen, no impaction  NOSE:  mucosa erythematous bilaterally with a mild amount of rhinorrhea, clear  discharge}               No frontal or maxillary sinus tenderness present bilaterally  MOUTH/THROAT: lips, tongue, & oral mucosa appear normal upon inspection                Posterior oropharynx is erythematous but without exudate, lesions or tonsillar  Edema, no dysphonia, no unilateral tonsillar edema, no uvular deviation,   no signs of peritonsillar abscess  NECK: supple, has full range of motion with no meningeal signs              No lymphadenopathy present  LUNG: normal work of breathing, good  respiratory effort without retractions, good air  movement, non labored, inspection reveals normal chest expansion w/  inspiration            Lung sounds are slightly wheezy to auscultation bilaterally,            No rales/rhonic/crackles noted           Cough noted has harsh but non productive       LABS:   Results for orders placed or performed in visit on 04/19/23   CBC with platelets and differential     Status: Abnormal   Result Value Ref Range    WBC Count 11.2 (H) 4.0 - 11.0 10e3/uL    RBC Count 5.12 4.40 - 5.90 10e6/uL    Hemoglobin 14.5 13.3 - 17.7 g/dL    Hematocrit 42.9 40.0 - 53.0 %    MCV 84 78 - 100 fL    MCH 28.3 26.5 - 33.0 pg    MCHC 33.8 31.5 - 36.5 g/dL    RDW 13.0 10.0 - 15.0 %    Platelet Count 315 150 - 450 10e3/uL    % Neutrophils 76 %    % Lymphocytes 14 %    % Monocytes 8 %    % Eosinophils 1 %    % Basophils 0 %    % Immature Granulocytes 0 %    Absolute Neutrophils 8.6 (H) 1.6 - 8.3 10e3/uL    Absolute Lymphocytes 1.6 0.8 - 5.3 10e3/uL    Absolute Monocytes 0.9 0.0 - 1.3 10e3/uL    Absolute Eosinophils 0.1 0.0 - 0.7 10e3/uL    Absolute Basophils 0.0 0.0 - 0.2 10e3/uL    Absolute Immature Granulocytes 0.0 <=0.4 10e3/uL   CBC with platelets and differential     Status: Abnormal    Narrative    The following orders were created for panel order CBC with platelets and differential.  Procedure                               Abnormality         Status                     ---------                               -----------         ------                     CBC with platelets and d...[[id number]]  Abnormal            Final result                 Please view results for these tests on the individual orders.       Imaging:   All images were personally read by this provider (myself).   Per my independent interpretation the xray shows no signs of consolidation or infiltrates suggesting pneumonia     I explained my diagnostic considerations and recommendations to the patient, who voiced understanding and  agreement with the treatment plan.   All questions were answered.   We discussed potential side effects, risks and benefits of any prescribed or recommended therapies, as well as expectations for response to treatments.  Please see AVS for any patient instructions & handouts given.   Patient was advised to contact the Nurse Care Line, their Primary Care provider, Urgent Care, or the Emergency Department if there are new or worsening symptoms, or call 911 for emergencies.        ______________________________________________________________________        Patient Instructions       Diagnosis: Bronchitis  possibly bacterial}   Today we did:  Chest xray no signs of pneumonia   Labs: slight elevated wbc count        Plan:     Bronchitis can last over two weeks     But your cough may persist for longer   If it appears to be bacterial antibiotic can help, however they are not without risk   Side effects of Antibiotic use:   - Stomach upset (nausea and vomiting) - take with food    - consider a probiotic to replace good bacteria in GI system (OTC probiotic pills or foods like yogurt with 'live cultures')   - Diarrhea    - antibiotics cause eradication of normal 'gut' kalpana causing diarrhea and       put you at risk of developing something called C.diff infection (infection of the large intestine)   - Takes 3-6 months to build back normal micro-biome and risk incomplete restoration     - fungal infections   - with eradication of good microorganisms, antibiotics can also increase risk of developing a    fungal infection        by disrupting the normal microbiome in many mucosal membranes areas      often seen in the mouth = thrush, or the vagina = yeast infection  - Skin reactions - red spotty rash that is not itchy or 'swollen' - common, not a true allergy    Can develop skin sloughing or true allergic reaction        - A true allergic reaction:   These are consistent with:     swelling in the face, mouth, throat,      having difficulty breathing    Skin reactions, including hives and itching, and flushed or pale skin.     Low blood pressure     Constriction of your airways and a swollen tongue or throat, which can cause     wheezing and trouble breathing    A weak and rapid pulse,     nausea, vomiting    dizziness or fainting  - antibiotic medications reduces the thickness of colonic mucus, increasing risk of pathogen invasion and intestinal inflammation and increased permeability  = leaky gut syndrome    - taking antibiotics or not finishing the dose can promote bacterial resistance, or growth of bacterial Super-bugs         Other to do:   1. OTC cold medicines  o Robitussin +D; day-Quil, nght-quil   o Decongestants -Sudafed    2. REST   3. Drinking plenty of fluids,   o such as water, juice, or warm soup.   o Fluids thin mucus so that you can cough it up.   - This helps you breathe more easily. Fluids also prevent dehydration.  4. Using a humidifier. This can help reduce coughing.  5. If you smoke, stop smoking! This include vaping     Monitor for:     Fever of 100.4 F ( 38.0 C) or higher, or as directed by your healthcare provider    Symptoms that get worse, or new symptoms    Symptoms that don't start to get better in 1 week    Trouble breathing that doesn't get better with treatment    Skin, lips, or nails that look blue, gray, or pale         Bronchitis  Bronchitis is infection or inflammation of the airways in the lungs (bronchial tubes). Normally, air moves easily in and out of the airways.   Bronchitis narrows the airways. This makes it harder for air to flow in and out of the lungs.   This causes symptoms, such as shortness of breath, coughing up yellow or green mucus, and wheezing.   Can also have: chest pain, fevers, feeling tired   Bronchitis can be acute or chronic.   Acute means it happens quickly and goes away in a short time, usually caused by a cold, flu or other virus   Most people with acute bronchitis get  better in 1 to 2 weeks.   Chronic means a condition lasts a long time and often comes back, this is typically seen in people who were or are smokers  Acute bronchitis is usually caused by a virus, such as a cold or the flu.   In very rare cases, it may be caused by bacteria.   Certain factors make it more likely for a cold or flu to turn into bronchitis.   These include being very young, being elderly, having a heart or lung problem, or having a weak immune system.   Smoking also makes bronchitis more likely.  When bronchitis develops, the airways become swollen.   Later on (usually >2 weeks) the airways may also become infected with bacteria. This is known as a secondary infection.      To diagnose bronchitis your provider will listen to your lungs while you breathe.   You may have a chest X-ray to look for (and rule out) a worse infection in the lungs (pneumonia) if you have had a fever.   You may also have a blood test or nose or throat swab to check for infection.

## 2023-05-09 DIAGNOSIS — M06.9 RHEUMATOID ARTHRITIS INVOLVING MULTIPLE JOINTS (H): ICD-10-CM

## 2023-05-09 NOTE — TELEPHONE ENCOUNTER
Last month: pt was advised of required follow up in order to continue Methotrexate.  Has not done so.  Then 4/19 had resp infection /CBC was done thru UC.    Routing to provider to consider another refill request.

## 2023-05-09 NOTE — TELEPHONE ENCOUNTER
LF: --  Qty: 72  Last Rheumatology Visit: 10/25/22   Next Scheduled Rheumatology Visit: none scheduled

## 2023-05-10 NOTE — TELEPHONE ENCOUNTER
Will approve 1 month refill. No further refills without an office visit.  He is up to date on labs, but he needs an office visit.    Leonor Awad PA-C on 5/10/2023 at 10:45 AM

## 2023-05-10 NOTE — TELEPHONE ENCOUNTER
Sent datatracker message notifying patient of 1 month refill and need for f/u appointment.    Carrie George RN on 5/10/2023 at 11:45 AM

## 2023-05-11 NOTE — TELEPHONE ENCOUNTER
90 day request denied. Per Leonor's message below, 1 month approved--needs a f/u appointment for further refills.    Left detailed message for pharmacy.  Carrie George RN on 5/11/2023 at 1:44 PM

## 2023-05-11 NOTE — TELEPHONE ENCOUNTER
Fax received from [facility] requesting 90 day supply.    Julia ROSALES RN  Specialty/Allergy Clinics

## 2023-05-23 ENCOUNTER — MYC MEDICAL ADVICE (OUTPATIENT)
Dept: RHEUMATOLOGY | Facility: CLINIC | Age: 38
End: 2023-05-23
Payer: COMMERCIAL

## 2023-06-05 NOTE — PROGRESS NOTES
Rheumatology Clinic Visit  [facility]  TRESSA Downey     Shady Maguire MRN# [id number]   YOB: 1985 Age: 37 year old   Date of Visit: 06/06/2023  Primary care provider: Hilary Hammer [facility]          Assessment and Plan:     1.  Rheumatoid arthritis  2.  High-risk medication use  3.  Elevated rheumatoid factor  4.  CCP antibody positive    Patient presents today for follow-up regarding his seropositive rheumatoid arthritis.  He continues to do quite well with regards to his joint symptoms.  He may have some joint pain here and there but is able to usually relate it to a trigger.  Mucositis has improved.  Physical examination today did not show any active synovitis.  Full fist formation.  Previous laboratory evaluations were reviewed, results below.    Patient continues to show improvement with the use of low-dose methotrexate.  He is usually consistent with the medication and remembering to take it weekly, on Thursdays.  He has been consistent with his folic acid daily.  We will therefore continue immunomodulatory medication given the significant benefit.  We will continue to monitor for any medication toxicity with routine labs.  We will check his labs today and then again every 3 months.      Plan:   1. Schedule follow-up with Leonor Awad PA-C in 3 months.   2. Labs: CBC, creatinine, AST, ALT and albumin every 3 months, will do this today then will be due again in September  3. Medication recommendations:   a. Methotrexate: Continue 10mg (4 tablets) WEEKLY. While on Methotrexate:  b. -check labs (ALT/AST, albumin, CBC with platelets and creatinine) every 3 months  c. -Limit alcohol to 2 drinks weekly  d. -Take Folic Acid 2mg daily   e. -Tylenol 500-1000mg can be used as needed up to three times daily for nausea/headache associated with dosing    TRESSA Downey  Rheumatology         History of Present Illness:   Shady Maguire presents for evaluation of polyarthritis.  "    Interval history June 7, 2023:  Joints have been good. He has not had any unusual joint symptoms. If there has been increased joint pain, he can usual determine a trigger. He does usually take his Methotrexate on Thursdays, but has had some weeks that he forgets. He has been taking his Folic acid. The mouth sores are spotty and back to \"normal\".      Interval history October 25, 2022:  His joints have been doing well. He has not had trouble with morning stiffness in his joints. He is tolerating the methotrexate. He takes his 4 tablets on Thursdays. He has been doing Folic Acid, 2mg. The mouth sores have decreased. He has not needed to use the Magic Mouthwash.     Interval history 7/19/2022:  He states that his joints have been pretty good. He states that he has had a couple mild episodes of joint pain, but nothing compared to what they were before. The knee is doing well also. No morning stiffness. He takes the Methotrexate on Thursdays. He did not have any side effects from the Methotrexate for the first couple weeks, but after he finished the prednisone, when he took the methotrexate, on Friday or Saturday he had multiple mouth sores. This caused him to not want to eat food, he did, but there was decreased motivation. There was pain with sores. He is taking Folic Acid daily. This happened 2 thursdays ago. When he took the methotrexate again, it did not happen again.     Consult visit from 6/8/2022:  He has been keeping track of pain that has started and where it has been. This initiaed after having knee pain that was severe. Xray was normal. 2 days later he was unable lift his left arm up. He then had blood work done and put together a list. He has had pain in multiple joints including right wrist, left knee, left elbow, multiple fingers, left wrist, hips, shoulders, knees and toes. In these joints he has had sharp pains with movements, some pain even with rest. Difficulty with gripping. The pains have lasted " "anywhere from 1/2 day to 3+ days. He started prednisone. He states that seemed to help awhile. He started it on 5/9/2022. Before that every other day something new would start. He had bout 1.5 weeks with no pain, missed a couple prednisone doses and got back on it. Since then, it is back to every couple days. He has not had any morning stiffness. He can usually feel things starting the night before. He can feel a pain starting and the next morning he has the pain in that joint. Over the last couple years he \"threw\" his back out twice. It has not been back to 100% since then. He is unsure if he has had joint swelling. He has asked his wife as well, but they have not noticed significant swelling. No fatigue. No shortness of breath, coughing or chest pain. No skin rashes or mouth sores. History of smoking, limited now.     No family history of RA or lupus. He has a brother with crohn's. No personal history of psoriasis, ulcerative colitis or crohn's.          Review of Systems:     Constitutional: negative  Skin: negative  Eyes: negative  Ears/Nose/Throat: negative  Respiratory: No shortness of breath, dyspnea on exertion, cough, or hemoptysis  Cardiovascular: negative  Gastrointestinal: negative  Genitourinary: negative  Musculoskeletal: As above  Neurologic: negative  Psychiatric: negative  Hematologic/Lymphatic/Immunologic: negative  Endocrine: negative         Active Problem List:     Patient Active Problem List    Diagnosis Date Noted     Acute midline low back pain without sciatica 10/14/2019     Priority: Medium     Obesity (BMI 35.0-39.9) with comorbidity (H) 10/14/2019     Priority: Medium            Past Medical History:   No past medical history on file.  No past surgical history on file.         Social History:     Social History     Socioeconomic History     Marital status:      Spouse name: Not on file     Number of children: Not on file     Years of education: Not on file     Highest education " level: Not on file   Occupational History     Not on file   Tobacco Use     Smoking status: Former     Packs/day: 0.00     Types: Cigarettes     Quit date: 2010     Years since quittin.4     Passive exposure: Never     Smokeless tobacco: Former     Types: Chew     Tobacco comments:     Smoked for a year while in college-only a social smoker   Vaping Use     Vaping status: Never Used   Substance and Sexual Activity     Alcohol use: Not Currently     Comment: occ     Drug use: Not Currently     Sexual activity: Yes     Partners: Female   Other Topics Concern     Not on file   Social History Narrative    2022    ENVIRONMENTAL HISTORY: [place] The home is heated with a forced air. They do have central air conditioning. The patient's bedroom is furnished with hard malinda in bedroom.  No pets. There is no history of cockroach or mice infestation. There is/are 0 smokers in the house.  The house does not have a damp basement. Noticed mold growth on basement window ki.     Social Determinants of Health     Financial Resource Strain: Not on file   Food Insecurity: Not on file   Transportation Needs: Not on file   Physical Activity: Not on file   Stress: Not on file   Social Connections: Not on file   Intimate Partner Violence: Not on file   Housing Stability: Not on file          Family History:     Family History   Problem Relation Age of Onset     Hypertension Father      Allergies Brother         Anesthesia     Allergies Brother         Bees     Crohn's Disease Brother      Heart Disease Maternal Grandmother      Cancer Maternal Grandfather         skin ca     Heart Disease Maternal Grandfather      Heart Disease Paternal Grandmother      Heart Disease Paternal Grandfather             Allergies:   No Known Allergies         Medications:     Current Outpatient Medications   Medication Sig Dispense Refill     BIOTIN PO Take by mouth daily       folic  acid (FOLVITE) 1 MG tablet Take 2 tablets (2 mg) by mouth daily 180 tablet 3     ibuprofen (ADVIL/MOTRIN) 200 MG tablet Take 200 mg by mouth       methocarbamol (ROBAXIN) 750 MG tablet 1 tab by mouth every 4 hrs. or 2 tabs every 8 hrs. as needed for muscle spasm. 30 tablet 1     methotrexate 2.5 MG tablet Take 4 tablets (10 mg) by mouth every 7 days Labs every 8-12 weeks. 48 tablet 0     Multiple Vitamin (MULTIVITAMIN ADULT PO) Take by mouth daily       olopatadine (PATADAY) 0.2 % ophthalmic solution Place 1 drop into both eyes daily Using prn       vitamin C with B complex (B COMPLEX-C) tablet Take 1 tablet by mouth daily              Physical Exam:   Blood pressure 129/86, pulse 82, resp. rate 12, height 1.829 m (6'), weight 124.6 kg (274 lb 9.6 oz), SpO2 95 %.  Wt Readings from Last 6 Encounters:   06/06/23 124.6 kg (274 lb 9.6 oz)   04/19/23 122.9 kg (271 lb)   10/25/22 121.1 kg (267 lb)   07/19/22 121.1 kg (267 lb)   06/17/22 121.5 kg (267 lb 14.4 oz)   06/08/22 123.8 kg (273 lb)     Constitutional: well-developed, appearing stated age; cooperative  Eyes: nl conjunctiva, sclera  ENT: nl external ears, hearing  Neck: no mass or thyroid enlargement  Resp: No shortness of breath with normal conversation  MS: No active synovitis or altered joint anatomy. Full joint ROM.  No dactylitis.   Psych: nl judgement, orientation, memory, affect.           Data:   Imaging:  Xray left knee 4/14/2022  Impression:     1.  Normal left knee. Normal joint alignment and spacing. No fracture  or bone lesion. No significant joint effusion.    6/8/2022 x-ray bilateral hands and wrists  IMPRESSION: No erosion or joint space narrowing. No soft tissue  swelling. Normal mineralization. Normal alignment.    Laboratory:  4/22/2022  CRP <2.9    Rheumatoid factor 31  Sed rate 7  Lyme 0.71  TIMOTHY negative    6/8/2022  Creatinine 1.01, GFR greater than 90  ALT 34, AST 15  CBC within normal limits    7/19/2022  Creatinine 0.89, GFR greater  than 90  Albumin 4.2  ALT 47, AST 22  CBC within normal limits    8/19/2022   CBC within normal limits  Creatinine 0.90, GFR greater than 90  Albumin 4.2  ALT 50, AST 28    3/27/2023  Creatinine 1.01, GFR >90  Albumin 4.6  ALT 50, AST 32  White blood cell count 5.9, hemoglobin 14.8, platelet count 263

## 2023-06-06 ENCOUNTER — OFFICE VISIT (OUTPATIENT)
Dept: RHEUMATOLOGY | Facility: CLINIC | Age: 38
End: 2023-06-06
Payer: COMMERCIAL

## 2023-06-06 VITALS
BODY MASS INDEX: 37.19 KG/M2 | HEIGHT: 72 IN | WEIGHT: 274.6 LBS | RESPIRATION RATE: 12 BRPM | SYSTOLIC BLOOD PRESSURE: 129 MMHG | OXYGEN SATURATION: 95 % | DIASTOLIC BLOOD PRESSURE: 86 MMHG | HEART RATE: 82 BPM

## 2023-06-06 DIAGNOSIS — M06.9 RHEUMATOID ARTHRITIS INVOLVING MULTIPLE JOINTS (H): ICD-10-CM

## 2023-06-06 DIAGNOSIS — R76.8 ELEVATED RHEUMATOID FACTOR: ICD-10-CM

## 2023-06-06 DIAGNOSIS — R76.8 CYCLIC CITRULLINATED PEPTIDE (CCP) ANTIBODY POSITIVE: ICD-10-CM

## 2023-06-06 DIAGNOSIS — Z79.899 HIGH RISK MEDICATION USE: Primary | ICD-10-CM

## 2023-06-06 LAB
ALBUMIN SERPL BCG-MCNC: 4.6 G/DL (ref 3.5–5.2)
ALT SERPL W P-5'-P-CCNC: 49 U/L (ref 10–50)
AST SERPL W P-5'-P-CCNC: 31 U/L (ref 10–50)
CREAT SERPL-MCNC: 1.08 MG/DL (ref 0.67–1.17)
ERYTHROCYTE [DISTWIDTH] IN BLOOD BY AUTOMATED COUNT: 13.2 % (ref 10–15)
GFR SERPL CREATININE-BSD FRML MDRD: >90 ML/MIN/1.73M2
HCT VFR BLD AUTO: 43.5 % (ref 40–53)
HGB BLD-MCNC: 14.4 G/DL (ref 13.3–17.7)
MCH RBC QN AUTO: 28.3 PG (ref 26.5–33)
MCHC RBC AUTO-ENTMCNC: 33.1 G/DL (ref 31.5–36.5)
MCV RBC AUTO: 86 FL (ref 78–100)
PLATELET # BLD AUTO: 282 10E3/UL (ref 150–450)
RBC # BLD AUTO: 5.08 10E6/UL (ref 4.4–5.9)
WBC # BLD AUTO: 5.1 10E3/UL (ref 4–11)

## 2023-06-06 PROCEDURE — 85027 COMPLETE CBC AUTOMATED: CPT | Performed by: PHYSICIAN ASSISTANT

## 2023-06-06 PROCEDURE — 99214 OFFICE O/P EST MOD 30 MIN: CPT | Performed by: PHYSICIAN ASSISTANT

## 2023-06-06 PROCEDURE — 82040 ASSAY OF SERUM ALBUMIN: CPT | Performed by: PHYSICIAN ASSISTANT

## 2023-06-06 PROCEDURE — 36415 COLL VENOUS BLD VENIPUNCTURE: CPT | Performed by: PHYSICIAN ASSISTANT

## 2023-06-06 PROCEDURE — 84450 TRANSFERASE (AST) (SGOT): CPT | Performed by: PHYSICIAN ASSISTANT

## 2023-06-06 PROCEDURE — 82565 ASSAY OF CREATININE: CPT | Performed by: PHYSICIAN ASSISTANT

## 2023-06-06 PROCEDURE — 84460 ALANINE AMINO (ALT) (SGPT): CPT | Performed by: PHYSICIAN ASSISTANT

## 2023-06-06 RX ORDER — MULTIVITAMIN WITH IRON
1 TABLET ORAL DAILY
COMMUNITY
End: 2024-03-26

## 2023-06-06 ASSESSMENT — PAIN SCALES - GENERAL: PAINLEVEL: NO PAIN (0)

## 2023-06-06 NOTE — PATIENT INSTRUCTIONS
After Visit Instructions:     Thank you for coming to [facility] for your care. It is my goal to partner with you to help you reach your optimal state of health.       Plan:     Schedule follow-up with Leonor Awad PA-C in 3 months.   Labs: CBC, creatinine, AST, ALT and albumin every 3 months, will do this today then will be due again in September  Medication recommendations:   Methotrexate: Continue 10mg (4 tablets) WEEKLY. While on Methotrexate:  -check labs (ALT/AST, albumin, CBC with platelets and creatinine) every 3 months  -Limit alcohol to 2 drinks weekly  -Take Folic Acid 2mg daily   -Tylenol 500-1000mg can be used as needed up to three times daily for nausea/headache associated with dosing        Leonor Awad PA-C  [facility]  [facility]    Contact information: [facility]  Clinic Number:  [phone]  Please call or send a Vizify message with any questions about your care    [FreeTextEntry1] : \par #1. Diet and exercise for weight loss\par #2. Could consider Advair but for now will continue as needed ProAir for wheeze and exertional SOB; pt improved clinically without chronic BD therapy\par #3. Exertional SOB and restrictive pattern on spirometry are likely weight related; follow lung function intermittently if pt allows\par #4. Prior CXR from 2018 was clear by report and repeat with mildly elevated R hemidiaphragm with ? RLL infiltrate vs atelectasis; xray fluoroscopy to evaluate possible paralysis of R hemidiaphragm revealed no paradoxical motion but with moderately impaired motion of unclear etiology; could consider neurologic evaluation but he has not been interested; consider repeat imaging studies to f/u pulm edema and atelectasis if pt allows \par #5. Rec AutoCPAP 7-16 cm of water for moderate JESSICA which had improved from previous with weight loss; encouraged compliance with CPAP or oral appliance therapy as pt can tolerate; CPAP therapy does seem to be effective when he uses it; The patient understands the risks of untreated JESSICA including heart disease, strokes, hypertension, pulmonary hypertension, and motor vehicle accidents as well as the need for treatment and weight loss. He is willing to try PAP therapy again\par #6.  Recommended second pulmonary opinion in the past since he seemed frustrated with his current condition and lack of improvement despite therapy\par #7. Consider eventual MCT to r/o asthma if intermittent SOB and wheeze persist but pt is not interested\par #8. Rec that lung function should be followed periodically when pt is willing\par #9. Consider bariatric surgery re-evaluation given weight gain; also recommended nutritionist lawrence; info given to pt previously; he is willing to consider surgical options if unable to lose weight on his own\par #10. Replace equipment as needed if uses CPAP; gave and ordered AirFit N30i mask previously; ordered equipment 3/21/23\par #11. F/u with obesity medicine for weight loss\par #12. Recommended Covid vaccines; s/p one Covid vaccine\par #13. Pt now more willing to f/u and follow recommendations\par #14. Reviewed risks of exposure and symptoms of Covid-19 virus, including how the virus is spread and precautions to avoid anita virus.\par \par The patient expressed understanding and agreement with the above recommendations/plan and accepts responsibility to be compliant with recommended testing, therapies, and f/u visits.\par All relevant questions and concerns were addressed.

## 2023-07-11 DIAGNOSIS — R76.8 CYCLIC CITRULLINATED PEPTIDE (CCP) ANTIBODY POSITIVE: ICD-10-CM

## 2023-07-11 DIAGNOSIS — Z79.899 HIGH RISK MEDICATION USE: ICD-10-CM

## 2023-07-11 DIAGNOSIS — M06.9 RHEUMATOID ARTHRITIS INVOLVING MULTIPLE JOINTS (H): ICD-10-CM

## 2023-07-11 NOTE — TELEPHONE ENCOUNTER
LF: not indicted  Qty: 72  Last Rheumatology Visit: 6/6/23   Next Scheduled Rheumatology Visit: 8/29/23    Pharmacy requesting 90 day refills.

## 2023-07-11 NOTE — TELEPHONE ENCOUNTER
Last OV 6/6/23. Per last OV note:    Plan:   Schedule follow-up with Leonor Awad PA-C in 3 months.   Labs: CBC, creatinine, AST, ALT and albumin every 3 months, will do this today then will be due again in September  Medication recommendations:             Methotrexate: Continue 10mg (4 tablets) WEEKLY. While on Methotrexate:  -check labs (ALT/AST, albumin, CBC with platelets and creatinine) every 3 months        Labs normal per 6/6 result note. Has an appointment on 8/29.  Refilled med to last until upcoming appointment.  Carrie George RN on 7/11/2023 at 10:46 AM

## 2023-07-30 NOTE — PROGRESS NOTES
Chief Complaint   Patient presents with     Sinus Problem     Follow up sinus issues- has not been saline rinses/-symptoms are about the same as before/has noticed bloody mucous and some bloody noses      History of Present Illness  Shady Maguire is a 36 year old male who presents today for follow-up.  I evaluated the patient on 12/22/2021.  On physical exam, he had signs and symptoms consistent with chronic sinusitis with nasal polyposis.  The patient received a Kenalog injection we started him on budesonide irrigations.  I had contacted the patient to check on his symptoms.  He was doing quite a bit better initially but then developed symptoms concerning for COVID-19.  He subsequently tested positive.  His symptoms improved after testing positive.  He returns today for follow-up.     The patient underwent allergy testing earlier 1/28/2022.  My review of the allergy testing showed positive to dust mite only.     From a symptom standpoint, the patient reports initial improvement after the Kenalog injection but some worsening after he developed COVID-19.  He used the irrigations for quite some time but there was then having significant dry nose and some bloody noses so he stopped using them. He has not had previous nose or sinus surgery.  He is currently using the budesonide irrigations  as needed.  He has a remote history of smoking, less than 10 pack years. No history of migraine headache.  No history of asthma.  No aspirin/NSAID sensitivity. No previous nose or sinus imaging.      Past Medical History  Patient Active Problem List   Diagnosis     Acute midline low back pain without sciatica     Obesity (BMI 35.0-39.9) with comorbidity (H)     Current Medications    Current Outpatient Medications:      budesonide (PULMICORT) 0.5 MG/2ML neb solution, Place 0.5 mg/2 mL budesonide vial in 8 oz normal saline sinus rinse bottle.  Irrigate each nostril with one half of the bottle twice daily. (Patient not taking: Reported  on 2022), Disp: 360 mL, Rfl: 3     ibuprofen (ADVIL/MOTRIN) 200 MG tablet, Take 200 mg by mouth (Patient not taking: Reported on 2022), Disp: , Rfl:      methocarbamol (ROBAXIN) 750 MG tablet, 1 tab by mouth every 4 hrs. or 2 tabs every 8 hrs. as needed for muscle spasm. (Patient not taking: Reported on 2021), Disp: 30 tablet, Rfl: 1     olopatadine (PATADAY) 0.2 % ophthalmic solution, Place 1 drop into both eyes daily Using prn (Patient not taking: Reported on 2022), Disp: , Rfl:      prednisoLONE acetate (PRED FORTE) 1 % ophthalmic suspension, , Disp: , Rfl:     Allergies  No Known Allergies    Social History  Social History     Socioeconomic History     Marital status:      Spouse name: Not on file     Number of children: Not on file     Years of education: Not on file     Highest education level: Not on file   Occupational History     Not on file   Tobacco Use     Smoking status: Former Smoker     Packs/day: 0.00     Types: Cigarettes     Quit date: 2010     Years since quittin.1     Smokeless tobacco: Former User     Types: Chew     Tobacco comment: Smoked for a year while in college-only a social smoker   Vaping Use     Vaping Use: Never used   Substance and Sexual Activity     Alcohol use: Yes     Comment: occ     Drug use: Not Currently     Sexual activity: Yes     Partners: Female   Other Topics Concern     Not on file   Social History Narrative    2022    ENVIRONMENTAL HISTORY: [place] The home is heated with a forced air. They do have central air conditioning. The patient's bedroom is furnished with hard malinda in bedroom.  No pets. There is no history of cockroach or mice infestation. There is/are 0 smokers in the house.  The house does not have a damp basement. Noticed mold growth on basement window ki.     Social Determinants of Health     Financial Resource Strain: Not on file   Food Insecurity: Not  on file   Transportation Needs: Not on file   Physical Activity: Not on file   Stress: Not on file   Social Connections: Not on file   Intimate Partner Violence: Not on file   Housing Stability: Not on file       Family History  Family History   Problem Relation Age of Onset     Hypertension Father      Allergies Brother         Anesthesia     Allergies Brother         Bees     Crohn's Disease Brother      Heart Disease Maternal Grandmother      Cancer Maternal Grandfather         skin ca     Heart Disease Maternal Grandfather      Heart Disease Paternal Grandmother      Heart Disease Paternal Grandfather        Review of Systems  As per HPI and PMHx, otherwise 10 system review including the head and neck, constitutional, eyes, respiratory, GI, skin, neurologic, lymphatic, endocrine, and allergy systems is negative.    Physical Exam  BP (!) 143/93 (BP Location: Right arm, Patient Position: Sitting, Cuff Size: Adult Large)   Pulse 87   Temp 97.8  F (36.6  C) (Tympanic)   Ht 1.829 m (6')   Wt 119.7 kg (264 lb)   BMI 35.80 kg/m    GENERAL: Patient is a pleasant, cooperative 36 year old male in no acute distress.  HEAD: Normocephalic, atraumatic.  Hair and scalp are normal.  EYES: Pupils are equal, round, reactive to light and accommodation.  Extraocular movements are intact.  The sclera nonicteric without injection.  The extraocular structures are normal.  EARS: Normal shape and symmetry.  No tenderness when palpating the mastoid or tragal areas bilaterally.   NOSE: Nares are patent.  Nasal mucosa is significantly dry with some sticky, inflammatory mucus.  The patient has moderate inferior turbinate hypertrophy bilaterally right greater than left.    The patient does have obvious grade 2 nasal polyps bilaterally.  No nasal masses or mucopurulence on anterior rhinoscopy.    NEUROLOGIC: Cranial nerves II through XII are grossly intact.  Voice is strong.  Patient is House-Brackmann I/VI bilaterally.  CARDIOVASCULAR:  Extremities are warm and well-perfused.  No significant peripheral edema.  RESPIRATORY: Patient has nonlabored breathing without cough, wheeze, stridor.  PSYCHIATRIC: Patient is alert and oriented.  Mood and affect appear normal.  SKIN: Warm and dry.  No scalp, face, or neck lesions noted.     Assessment and Plan     ICD-10-CM    1. Chronic sinusitis, unspecified location  J32.9    2. Nasal polyposis  J33.9    3. Nasal obstruction  J34.89    4. Deviated nasal septum  J34.2    5. Nasal turbinate hypertrophy  J34.3    6. Allergic rhinitis due to dust mite  J30.89       It was my pleasure seeing Shady Maguire today in clinic.  The patient's nose is quite dry on examination today.  He did get some benefit from the Kenalog injection but does continue to still struggle with chronic sinus symptoms.  Is not a point where he could have surgery currently.  We will hold off on any CT imaging at this point but would consider CT imaging closer the time of surgical intervention in the future.  We discussed another Kenalog injection in the future if his symptoms worsen.  We will have him hold off on the budesonide irrigations right now given his nasal dryness.  We discussed using Ponaris nasal drops in the nose as well as saline gel as much as possible to keep the nose moist.  We discussed a humidifier for his home.    We discussed dust mite intervention including dust mite covers, washing sheets in hot water several times a week, getting rid of drapery and carpet.    We also discussed his lower extremity pruritus.  This sounds like dry skin since the CeraVe seems to help.  If his symptoms are persistent or worsen, we should get CBC, liver function, TSH.    The patient will contact me in the future when he is thinking he might undergo surgery.  He will contact me if his symptoms worsen.    Shady to follow up with Primary Care provider regarding elevated blood pressure.    Bertram Dao MD  Department of Otolaryngology-Head and Neck  Surgery  -[facility]      30-Jul-2023 04:12

## 2023-08-21 NOTE — PROGRESS NOTES
Rheumatology Clinic Visit  [facility]  Leonor Awad, TRESSA     Shady Maguire MRN# [id number]   YOB: 1985 Age: 38 year old   Date of Visit: 08/29/2023  Primary care provider: Hilary Hammer [facility]          Assessment and Plan:     1.  Rheumatoid arthritis  2.  High-risk medication use  3.  Elevated rheumatoid factor  4.  CCP antibody positive      Patient presents today for follow-up regarding his seropositive rheumatoid arthritis.  He overall continues to do well with the low-dose monotherapy methotrexate.  He does continue to notice some joint pain here and there and feels like he can always related to a specific event.  He thinks this may be happening more frequently but plans to monitor at a little bit closer to see if it is happening more frequently.  Physical examination today did not show any active synovitis.  He has full fist formation and normal  strength.  Previous laboratory evaluations were reviewed and are as below.      Patient continues to show improvement with the use of low-dose methotrexate monotherapy.  We did discuss potentially increasing the dose to 5 tablets weekly.  At 6 tablets he did have issues with mouth sores, therefore we will continue on the 4 tablets for another 3 months and he will closely monitor how often he is noticing joint symptoms.  Did also discuss that he can also increase his folic acid to help prevent the bone sores.  We will check labs today to monitor for any medication toxicity.  We will continue to check them every 3 months while he is on methotrexate.    Plan:   chedule follow-up with Leonor Awad PA-C in 3 months.   Labs: CBC, creatinine, AST, ALT and albumin every 3 months, will do this today then will be due again at the end of November   Medication recommendations:   Methotrexate: Continue 10mg (4 tablets) WEEKLY. While on Methotrexate:  -check labs (ALT/AST, albumin, CBC with platelets and creatinine) every 3 months  -Limit  "alcohol to 2 drinks weekly  -Take Folic Acid 2-3mg daily   -Tylenol 500-1000mg can be used as needed up to three times daily for nausea/headache associated with dosing    TRESSA Downey  Rheumatology         History of Present Illness:   Shady Maguire presents for evaluation of polyarthritis.     Interval history August 29, 2023:  He has had some joint symptoms that he has noticed. He feels that he can usually explain them. He has continued on the 4 tablets of Methotrexate.       Interval history June 7, 2023:  Joints have been good. He has not had any unusual joint symptoms. If there has been increased joint pain, he can usual determine a trigger. He does usually take his Methotrexate on Thursdays, but has had some weeks that he forgets. He has been taking his Folic acid. The mouth sores are spotty and back to \"normal\".      Interval history October 25, 2022:  His joints have been doing well. He has not had trouble with morning stiffness in his joints. He is tolerating the methotrexate. He takes his 4 tablets on Thursdays. He has been doing Folic Acid, 2mg. The mouth sores have decreased. He has not needed to use the Magic Mouthwash.     Interval history 7/19/2022:  He states that his joints have been pretty good. He states that he has had a couple mild episodes of joint pain, but nothing compared to what they were before. The knee is doing well also. No morning stiffness. He takes the Methotrexate on Thursdays. He did not have any side effects from the Methotrexate for the first couple weeks, but after he finished the prednisone, when he took the methotrexate, on Friday or Saturday he had multiple mouth sores. This caused him to not want to eat food, he did, but there was decreased motivation. There was pain with sores. He is taking Folic Acid daily. This happened 2 thursdays ago. When he took the methotrexate again, it did not happen again.     Consult visit from 6/8/2022:  He has been keeping track of pain " "that has started and where it has been. This initiaed after having knee pain that was severe. Xray was normal. 2 days later he was unable lift his left arm up. He then had blood work done and put together a list. He has had pain in multiple joints including right wrist, left knee, left elbow, multiple fingers, left wrist, hips, shoulders, knees and toes. In these joints he has had sharp pains with movements, some pain even with rest. Difficulty with gripping. The pains have lasted anywhere from 1/2 day to 3+ days. He started prednisone. He states that seemed to help awhile. He started it on 5/9/2022. Before that every other day something new would start. He had bout 1.5 weeks with no pain, missed a couple prednisone doses and got back on it. Since then, it is back to every couple days. He has not had any morning stiffness. He can usually feel things starting the night before. He can feel a pain starting and the next morning he has the pain in that joint. Over the last couple years he \"threw\" his back out twice. It has not been back to 100% since then. He is unsure if he has had joint swelling. He has asked his wife as well, but they have not noticed significant swelling. No fatigue. No shortness of breath, coughing or chest pain. No skin rashes or mouth sores. History of smoking, limited now.     No family history of RA or lupus. He has a brother with crohn's. No personal history of psoriasis, ulcerative colitis or crohn's.          Review of Systems:     Constitutional: negative  Skin: negative  Eyes: negative  Ears/Nose/Throat: negative  Respiratory: No shortness of breath, dyspnea on exertion, cough, or hemoptysis  Cardiovascular: negative  Gastrointestinal: negative  Genitourinary: negative  Musculoskeletal: As above  Neurologic: negative  Psychiatric: negative  Hematologic/Lymphatic/Immunologic: negative  Endocrine: negative         Active Problem List:     Patient Active Problem List    Diagnosis Date Noted    " Acute midline low back pain without sciatica 10/14/2019     Priority: Medium    Obesity (BMI 35.0-39.9) with comorbidity (H) 10/14/2019     Priority: Medium            Past Medical History:   History reviewed. No pertinent past medical history.  History reviewed. No pertinent surgical history.         Social History:     Social History     Socioeconomic History    Marital status:      Spouse name: Not on file    Number of children: Not on file    Years of education: Not on file    Highest education level: Not on file   Occupational History    Not on file   Tobacco Use    Smoking status: Former     Packs/day: 0.00     Types: Cigarettes     Quit date: 2010     Years since quittin.6     Passive exposure: Never    Smokeless tobacco: Former     Types: Chew    Tobacco comments:     Smoked for a year while in college-only a social smoker   Vaping Use    Vaping Use: Never used   Substance and Sexual Activity    Alcohol use: Yes     Comment: occasional    Drug use: Not Currently    Sexual activity: Yes     Partners: Female   Other Topics Concern    Not on file   Social History Narrative    2022    ENVIRONMENTAL HISTORY: [place] The home is heated with a forced air. They do have central air conditioning. The patient's bedroom is furnished with hard malinda in bedroom.  No pets. There is no history of cockroach or mice infestation. There is/are 0 smokers in the house.  The house does not have a damp basement. Noticed mold growth on basement window ki.     Social Determinants of Health     Financial Resource Strain: Not on file   Food Insecurity: Not on file   Transportation Needs: Not on file   Physical Activity: Not on file   Stress: Not on file   Social Connections: Not on file   Intimate Partner Violence: Not on file   Housing Stability: Not on file          Family History:     Family History   Problem Relation Age of Onset    Hypertension  Father     Allergies Brother         Anesthesia    Allergies Brother         Bees    Crohn's Disease Brother     Heart Disease Maternal Grandmother     Cancer Maternal Grandfather         skin ca    Heart Disease Maternal Grandfather     Heart Disease Paternal Grandmother     Heart Disease Paternal Grandfather             Allergies:   No Known Allergies         Medications:     Current Outpatient Medications   Medication Sig Dispense Refill    BIOTIN PO Take by mouth daily      folic acid (FOLVITE) 1 MG tablet Take 2 tablets (2 mg) by mouth daily 180 tablet 3    ibuprofen (ADVIL/MOTRIN) 200 MG tablet Take 200 mg by mouth      methocarbamol (ROBAXIN) 750 MG tablet 1 tab by mouth every 4 hrs. or 2 tabs every 8 hrs. as needed for muscle spasm. 30 tablet 1    methotrexate 2.5 MG tablet Take 4 tablets (10 mg) by mouth every 7 days Labs every 8-12 weeks. 48 tablet 0    Multiple Vitamin (MULTIVITAMIN ADULT PO) Take by mouth daily      olopatadine (PATADAY) 0.2 % ophthalmic solution Place 1 drop into both eyes daily Using prn      vitamin C with B complex (B COMPLEX-C) tablet Take 1 tablet by mouth daily              Physical Exam:   Blood pressure (!) 136/97, pulse 87, resp. rate 20, height 1.829 m (6'), weight 126 kg (277 lb 12.8 oz), SpO2 96 %.  Wt Readings from Last 6 Encounters:   08/29/23 126 kg (277 lb 12.8 oz)   06/06/23 124.6 kg (274 lb 9.6 oz)   04/19/23 122.9 kg (271 lb)   10/25/22 121.1 kg (267 lb)   07/19/22 121.1 kg (267 lb)   06/17/22 121.5 kg (267 lb 14.4 oz)     Constitutional: well-developed, appearing stated age; cooperative  Eyes: nl conjunctiva, sclera  ENT: nl external ears, hearing  Neck: no mass or thyroid enlargement  Resp: No shortness of breath with normal conversation  MS: No active synovitis or altered joint anatomy.  Full fist formation and normal  strength.  No dactylitis.   Psych: nl judgement, orientation, memory, affect.           Data:   Imaging:  Xray left knee  4/14/2022  Impression:     1.  Normal left knee. Normal joint alignment and spacing. No fracture  or bone lesion. No significant joint effusion.    6/8/2022 x-ray bilateral hands and wrists  IMPRESSION: No erosion or joint space narrowing. No soft tissue  swelling. Normal mineralization. Normal alignment.    Laboratory:  4/22/2022  CRP <2.9    Rheumatoid factor 31  Sed rate 7  Lyme 0.71  TIMOTHY negative    6/8/2022  Creatinine 1.01, GFR greater than 90  ALT 34, AST 15  CBC within normal limits    7/19/2022  Creatinine 0.89, GFR greater than 90  Albumin 4.2  ALT 47, AST 22  CBC within normal limits    8/19/2022   CBC within normal limits  Creatinine 0.90, GFR greater than 90  Albumin 4.2  ALT 50, AST 28    3/27/2023  Creatinine 1.01, GFR >90  Albumin 4.6  ALT 50, AST 32  White blood cell count 5.9, hemoglobin 14.8, platelet count 263    6/6/2023  Creatinine 1.08, GFR greater than 90  Albumin 4.6  ALT 49, AST 31  White blood cell count 5.1, hemoglobin 14.4, platelet count 282

## 2023-08-29 ENCOUNTER — OFFICE VISIT (OUTPATIENT)
Dept: RHEUMATOLOGY | Facility: CLINIC | Age: 38
End: 2023-08-29
Payer: COMMERCIAL

## 2023-08-29 VITALS
SYSTOLIC BLOOD PRESSURE: 136 MMHG | WEIGHT: 277.8 LBS | DIASTOLIC BLOOD PRESSURE: 97 MMHG | HEART RATE: 87 BPM | OXYGEN SATURATION: 96 % | HEIGHT: 72 IN | RESPIRATION RATE: 20 BRPM | BODY MASS INDEX: 37.63 KG/M2

## 2023-08-29 DIAGNOSIS — M06.9 RHEUMATOID ARTHRITIS INVOLVING MULTIPLE JOINTS (H): Primary | ICD-10-CM

## 2023-08-29 DIAGNOSIS — Z79.899 HIGH RISK MEDICATION USE: ICD-10-CM

## 2023-08-29 DIAGNOSIS — R76.8 ELEVATED RHEUMATOID FACTOR: ICD-10-CM

## 2023-08-29 DIAGNOSIS — R76.8 CYCLIC CITRULLINATED PEPTIDE (CCP) ANTIBODY POSITIVE: ICD-10-CM

## 2023-08-29 LAB
ALBUMIN SERPL BCG-MCNC: 4.8 G/DL (ref 3.5–5.2)
ALT SERPL W P-5'-P-CCNC: 46 U/L (ref 0–70)
AST SERPL W P-5'-P-CCNC: 26 U/L (ref 0–45)
CREAT SERPL-MCNC: 0.93 MG/DL (ref 0.67–1.17)
ERYTHROCYTE [DISTWIDTH] IN BLOOD BY AUTOMATED COUNT: 12.8 % (ref 10–15)
GFR SERPL CREATININE-BSD FRML MDRD: >90 ML/MIN/1.73M2
HCT VFR BLD AUTO: 43.3 % (ref 40–53)
HGB BLD-MCNC: 14.6 G/DL (ref 13.3–17.7)
MCH RBC QN AUTO: 28.7 PG (ref 26.5–33)
MCHC RBC AUTO-ENTMCNC: 33.7 G/DL (ref 31.5–36.5)
MCV RBC AUTO: 85 FL (ref 78–100)
PLATELET # BLD AUTO: 277 10E3/UL (ref 150–450)
RBC # BLD AUTO: 5.08 10E6/UL (ref 4.4–5.9)
WBC # BLD AUTO: 6.9 10E3/UL (ref 4–11)

## 2023-08-29 PROCEDURE — 82565 ASSAY OF CREATININE: CPT | Performed by: PHYSICIAN ASSISTANT

## 2023-08-29 PROCEDURE — 84450 TRANSFERASE (AST) (SGOT): CPT | Performed by: PHYSICIAN ASSISTANT

## 2023-08-29 PROCEDURE — 99213 OFFICE O/P EST LOW 20 MIN: CPT | Performed by: PHYSICIAN ASSISTANT

## 2023-08-29 PROCEDURE — 84460 ALANINE AMINO (ALT) (SGPT): CPT | Performed by: PHYSICIAN ASSISTANT

## 2023-08-29 PROCEDURE — 36415 COLL VENOUS BLD VENIPUNCTURE: CPT | Performed by: PHYSICIAN ASSISTANT

## 2023-08-29 PROCEDURE — 82040 ASSAY OF SERUM ALBUMIN: CPT | Performed by: PHYSICIAN ASSISTANT

## 2023-08-29 PROCEDURE — 85027 COMPLETE CBC AUTOMATED: CPT | Performed by: PHYSICIAN ASSISTANT

## 2023-08-29 RX ORDER — FOLIC ACID 1 MG/1
2 TABLET ORAL DAILY
Qty: 180 TABLET | Refills: 3 | Status: CANCELLED | OUTPATIENT
Start: 2023-08-29

## 2023-08-29 ASSESSMENT — PAIN SCALES - GENERAL: PAINLEVEL: NO PAIN (0)

## 2023-10-04 ENCOUNTER — MYC MEDICAL ADVICE (OUTPATIENT)
Dept: RHEUMATOLOGY | Facility: CLINIC | Age: 38
End: 2023-10-04
Payer: COMMERCIAL

## 2023-10-25 ENCOUNTER — OFFICE VISIT (OUTPATIENT)
Dept: FAMILY MEDICINE | Facility: CLINIC | Age: 38
End: 2023-10-25
Payer: COMMERCIAL

## 2023-10-25 VITALS
HEIGHT: 72 IN | WEIGHT: 278.6 LBS | BODY MASS INDEX: 37.73 KG/M2 | DIASTOLIC BLOOD PRESSURE: 88 MMHG | OXYGEN SATURATION: 97 % | HEART RATE: 97 BPM | SYSTOLIC BLOOD PRESSURE: 146 MMHG | RESPIRATION RATE: 16 BRPM | TEMPERATURE: 98.5 F

## 2023-10-25 DIAGNOSIS — Z13.1 SCREENING FOR DIABETES MELLITUS: ICD-10-CM

## 2023-10-25 DIAGNOSIS — E66.01 CLASS 2 SEVERE OBESITY WITH SERIOUS COMORBIDITY AND BODY MASS INDEX (BMI) OF 38.0 TO 38.9 IN ADULT, UNSPECIFIED OBESITY TYPE (H): ICD-10-CM

## 2023-10-25 DIAGNOSIS — J30.2 SEASONAL ALLERGIC RHINITIS, UNSPECIFIED TRIGGER: ICD-10-CM

## 2023-10-25 DIAGNOSIS — Z13.220 LIPID SCREENING: ICD-10-CM

## 2023-10-25 DIAGNOSIS — M05.79 RHEUMATOID ARTHRITIS INVOLVING MULTIPLE SITES WITH POSITIVE RHEUMATOID FACTOR (H): ICD-10-CM

## 2023-10-25 DIAGNOSIS — Z00.00 ROUTINE GENERAL MEDICAL EXAMINATION AT A HEALTH CARE FACILITY: Primary | ICD-10-CM

## 2023-10-25 DIAGNOSIS — E66.812 CLASS 2 SEVERE OBESITY WITH SERIOUS COMORBIDITY AND BODY MASS INDEX (BMI) OF 38.0 TO 38.9 IN ADULT, UNSPECIFIED OBESITY TYPE (H): ICD-10-CM

## 2023-10-25 PROBLEM — M06.9 RHEUMATOID ARTHRITIS (H): Status: ACTIVE | Noted: 2023-10-25

## 2023-10-25 PROCEDURE — 90677 PCV20 VACCINE IM: CPT | Performed by: FAMILY MEDICINE

## 2023-10-25 PROCEDURE — 99213 OFFICE O/P EST LOW 20 MIN: CPT | Mod: 25 | Performed by: FAMILY MEDICINE

## 2023-10-25 PROCEDURE — 90472 IMMUNIZATION ADMIN EACH ADD: CPT | Performed by: FAMILY MEDICINE

## 2023-10-25 PROCEDURE — 90686 IIV4 VACC NO PRSV 0.5 ML IM: CPT | Performed by: FAMILY MEDICINE

## 2023-10-25 PROCEDURE — 90471 IMMUNIZATION ADMIN: CPT | Performed by: FAMILY MEDICINE

## 2023-10-25 PROCEDURE — 99395 PREV VISIT EST AGE 18-39: CPT | Mod: 25 | Performed by: FAMILY MEDICINE

## 2023-10-25 RX ORDER — FLUTICASONE PROPIONATE 50 MCG
1 SPRAY, SUSPENSION (ML) NASAL DAILY
Qty: 16 G | Refills: 11 | Status: SHIPPED | OUTPATIENT
Start: 2023-10-25 | End: 2023-11-20

## 2023-10-25 ASSESSMENT — ENCOUNTER SYMPTOMS
DIZZINESS: 0
COUGH: 0
CONSTIPATION: 0
HEARTBURN: 0
JOINT SWELLING: 0
PARESTHESIAS: 0
CHILLS: 0
HEMATOCHEZIA: 0
ABDOMINAL PAIN: 0
MYALGIAS: 0
FEVER: 0
PALPITATIONS: 0
ARTHRALGIAS: 0
HEADACHES: 0
DIARRHEA: 0
FREQUENCY: 0
EYE PAIN: 0
NERVOUS/ANXIOUS: 0
HEMATURIA: 0
SHORTNESS OF BREATH: 0
SORE THROAT: 0
NAUSEA: 0
WEAKNESS: 0
DYSURIA: 0

## 2023-10-25 ASSESSMENT — PAIN SCALES - GENERAL: PAINLEVEL: NO PAIN (0)

## 2023-10-25 NOTE — PATIENT INSTRUCTIONS
Get tetanus, diphtheria, pertussis, and covid-19 vaccine within the next 2-3 weeks.    I will contact your rheumatologist about giving you shinglesvaccine.  If you would like to receive the shingles vaccine, verify with your insurance provider on how they cover the vaccine, and schedule a vaccination appointment with the facility that they prefer you to get this from.     Fasting lab test next time you have a blood draw.    Start Flonase nasal spray 1 spray to each nostril daily - see if this relieves you of your nasal symptoms after next 1-2 months.    Preventative Care Visits include: Yearly physicals, Well-child visits, Welcome to Medicare visits, & Medicare yearly wellness exams.    The purpose of these visits is to discuss your medical history and prevent health problems before you are sick.  You may need to pay a copay, coinsurance or deductible if your visit today includes testing or treating for a new or existing condition.    Additional charges may be incurred for today's visit. If you have questions about what your insurance plan covers, please contact your Insurance Company's member service department.  If you have questions specific to a bill you have already received from [facility], please contact the [facility] Billing Office at [phone].      Preventive Health Recommendations  Male Ages 26 - 39    Yearly exam:             See your health care provider every year in order to  o   Review health changes.   o   Discuss preventive care.    o   Review your medicines if your doctor has prescribed any.  You should be tested each year for STDs (sexually transmitted diseases), if you re at risk.   After age 35, talk to your provider about cholesterol testing. If you are at risk for heart disease, have your cholesterol tested at least every 5 years.   If you are at risk for diabetes, you should have a diabetes test (fasting glucose).  Shots: Get a flu shot each year. Get a tetanus shot every 10 years.      Nutrition:  Eat at least 5 servings of fruits and vegetables daily.   Eat whole-grain bread, whole-wheat pasta and brown rice instead of white grains and rice.   Get adequate Calcium and Vitamin D.     Lifestyle  Exercise for at least 150 minutes a week (30 minutes a day, 5 days a week). This will help you control your weight and prevent disease.   Limit alcohol to one drink per day.   No smoking.   Wear sunscreen to prevent skin cancer.   See your dentist every six months for an exam and cleaning.     Preventive Health Recommendations  Male Ages 26 - 39    Yearly exam:             See your health care provider every year in order to  o   Review health changes.   o   Discuss preventive care.    o   Review your medicines if your doctor has prescribed any.  You should be tested each year for STDs (sexually transmitted diseases), if you re at risk.   After age 35, talk to your provider about cholesterol testing. If you are at risk for heart disease, have your cholesterol tested at least every 5 years.   If you are at risk for diabetes, you should have a diabetes test (fasting glucose).  Shots: Get a flu shot each year. Get a tetanus shot every 10 years.     Nutrition:  Eat at least 5 servings of fruits and vegetables daily.   Eat whole-grain bread, whole-wheat pasta and brown rice instead of white grains and rice.   Get adequate Calcium and Vitamin D.     Lifestyle  Exercise for at least 150 minutes a week (30 minutes a day, 5 days a week). This will help you control your weight and prevent disease.   Limit alcohol to one drink per day.   No smoking.   Wear sunscreen to prevent skin cancer.   See your dentist every six months for an exam and cleaning.

## 2023-10-25 NOTE — PROGRESS NOTES
SUBJECTIVE:   CC: Shady is an 38 year old who presents for preventative health visit.       10/25/2023     2:41 PM   Additional Questions   Roomed by Lisha GARCIA   Accompanied by self         10/25/2023     2:41 PM   Patient Reported Additional Medications   Patient reports taking the following new medications none       Healthy Habits:     Getting at least 3 servings of Calcium per day:  Yes    Bi-annual eye exam:  NO    Dental care twice a year:  NO    Sleep apnea or symptoms of sleep apnea:  None    Diet:  Regular (no restrictions)    Frequency of exercise:  4-5 days/week    Duration of exercise:  30-45 minutes    Taking medications regularly:  Yes    Medication side effects:  None    Additional concerns today:  Yes      Today's PHQ-2 Score:       10/25/2023     2:35 PM   PHQ-2 (  [employer])   Q1: Little interest or pleasure in doing things 0   Q2: Feeling down, depressed or hopeless 0   PHQ-2 Score 0   Q1: Little interest or pleasure in doing things Not at all   Q2: Feeling down, depressed or hopeless Not at all   PHQ-2 Score 0       Would like to discuss if eligible for shingrix.  Has RA. On methotrexate 10 mg weekly.    Discuss chronic nasal congestion for several years - occurs during cold months.  Saw allergist and ENT - various treatment tried. No permanent relief.  Patient has RA. Has also hx of GP conjunctivitis causing eye symptoms.  Never given nasal corticosteroids. Never used azelastine.  Takes loratadine daily - no help.        Social History     Tobacco Use    Smoking status: Former     Packs/day: 0     Types: Cigarettes     Quit date: 2010     Years since quittin.8     Passive exposure: Never    Smokeless tobacco: Former     Types: Chew    Tobacco comments:     Smoked for a year while in college-only a social smoker   Substance Use Topics    Alcohol use: Yes     Comment: occasional             10/25/2023     2:35 PM   Alcohol Use   Prescreen: >3 drinks/day or >7 drinks/week? No       Last PSA:  "No results found for: \"PSA\"    Reviewed orders with patient. Reviewed health maintenance and updated orders accordingly - Yes  Patient Active Problem List   Diagnosis    Acute midline low back pain without sciatica    Obesity (BMI 35.0-39.9) with comorbidity (H)    Rheumatoid arthritis (H)     No past surgical history on file.    Social History     Tobacco Use    Smoking status: Former     Packs/day: 0     Types: Cigarettes     Quit date: 2010     Years since quittin.8     Passive exposure: Never    Smokeless tobacco: Former     Types: Chew    Tobacco comments:     Smoked for a year while in college-only a social smoker   Substance Use Topics    Alcohol use: Yes     Comment: occasional     Family History   Problem Relation Age of Onset    Hypertension Father     Allergies Brother         Anesthesia    Allergies Brother         Bees    Crohn's Disease Brother     Heart Disease Maternal Grandmother     Cancer Maternal Grandfather         skin ca    Heart Disease Maternal Grandfather     Heart Disease Paternal Grandmother     Heart Disease Paternal Grandfather          Current Outpatient Medications   Medication Sig Dispense Refill    BIOTIN PO Take by mouth daily      fluticasone (FLONASE) 50 MCG/ACT nasal spray Spray 1 spray into both nostrils daily 16 g 11    folic acid (FOLVITE) 1 MG tablet Take 2 tablets (2 mg) by mouth daily 180 tablet 3    ibuprofen (ADVIL/MOTRIN) 200 MG tablet Take 200 mg by mouth      methocarbamol (ROBAXIN) 750 MG tablet 1 tab by mouth every 4 hrs. or 2 tabs every 8 hrs. as needed for muscle spasm. 30 tablet 1    methotrexate 2.5 MG tablet Take 4 tablets (10 mg) by mouth every 7 days Labs every 8-12 weeks. 48 tablet 0    Multiple Vitamin (MULTIVITAMIN ADULT PO) Take by mouth daily      olopatadine (PATADAY) 0.2 % ophthalmic solution Place 1 drop into both eyes daily Using prn      vitamin C with B complex (B COMPLEX-C) tablet Take 1 tablet by mouth daily       No Known " "Allergies    Reviewed and updated as needed this visit by clinical staff   Tobacco  Allergies  Meds              Reviewed and updated as needed this visit by Provider     Meds             No past medical history on file.   No past surgical history on file.    Review of Systems   Constitutional:  Negative for chills and fever.   HENT:  Positive for congestion. Negative for ear pain, hearing loss and sore throat.    Eyes:  Negative for pain and visual disturbance.   Respiratory:  Negative for cough and shortness of breath.    Cardiovascular:  Negative for chest pain, palpitations and peripheral edema.   Gastrointestinal:  Negative for abdominal pain, constipation, diarrhea, heartburn, hematochezia and nausea.   Genitourinary:  Negative for dysuria, frequency, genital sores, hematuria, impotence, penile discharge and urgency.   Musculoskeletal:  Negative for arthralgias, joint swelling and myalgias.   Skin:  Negative for rash.   Neurological:  Negative for dizziness, weakness, headaches and paresthesias.   Psychiatric/Behavioral:  Negative for mood changes. The patient is not nervous/anxious.      OBJECTIVE:   BP (!) 146/88 (BP Location: Right arm, Patient Position: Chair, Cuff Size: Adult Large)   Pulse 97   Temp 98.5  F (36.9  C) (Tympanic)   Resp 16   Ht 1.816 m (5' 11.5\")   Wt 126.4 kg (278 lb 9.6 oz)   SpO2 97%   BMI 38.32 kg/m      Physical Exam  GENERAL APPEARANCE: morbid obesity, ambulatory w/o assist, alert and no distress  EYES: pink conj, no icterus, PERRL, EOMI  ENT: tympanic membrane intact and non-erythematous bilaterally, moderate nasal congestion, pale swollen turbinates bilaterally, throat non-erythematous, tonsils not enlarged.  ORAL: mucosae moist   NECK: no adenopathy, no asymmetry, masses, or scars and thyroid normal to palpation  RESP: lungs clear to auscultation - no rales, rhonchi or wheezes  CV: regular rates and rhythm, normal S1 S2, no S3 or S4, no murmur, click or rub, no " peripheral edema and peripheral pulses strong  ABDOMEN: soft, nontender, no hepatosplenomegaly, no masses and bowel sounds normal  RECTAL: normal sphincter tone, no rectal masses, prostate slightly enlarged but smooth, soft and nontender  MS: no musculoskeletal defects are noted and gait is age appropriate without ataxia  SKIN: no suspicious lesions or rashes  NEURO: Normal strength and tone, sensory exam grossly normal, mentation intact and speech normal     Diagnostic Test Results:  Labs reviewed in Epic    ASSESSMENT/PLAN:   Shady was seen today for physical.    Diagnoses and all orders for this visit:    Routine general medical examination at a health care facility  Patient was advised on recommended screening and preventive health recommendations.  He verbalized understanding and agreed to the plans below.     Seasonal allergic rhinitis, unspecified trigger  -     fluticasone (FLONASE) 50 MCG/ACT nasal spray; Spray 1 spray into both nostrils daily  -     OFFICE/OUTPT VISIT,EST,LEVL IV  Patient was advised on causes, course, treatment and possible complication of allergic rhinitis.  Discussed in detail current guidelines and first line treatment of the condition.  Patient agreed to start flonase nasal spray.  Observe for triggers and avoid if possible.  Consider Allergist consult if not improved after 1-2 months.     Rheumatoid arthritis involving multiple sites with positive rheumatoid factor (H)  -     OFFICE/OUTPT VISIT,ESTLEVL IV  Sees rheumatology.  On methotrexate.  Hence, immunocompromised.  Reviewed with patient shingles vaccine recommendations per UpToDate. Patient is in the subpopulation who can get Shingrix.  Patient will check with insurance about coverage.  Will also send message to Rheum about this.    Class 2 severe obesity with serious comorbidity and body mass index (BMI) of 38.0 to 38.9 in adult, unspecified obesity type (H)  Increases complexity of management of the above chronic  "conditions.  Patient was advised healthy diet recommendations.  Patient was advised weekly exercise recommendations and goals.     Lipid screening  -     Lipid panel reflex to direct LDL Fasting; Future    Screening for diabetes mellitus  -     Glucose; Future    Other orders  -     Cancel: HEPATITIS B, ADULT 20+ (ENGERIX-B/RECOMBIVAX HB)  -     Pneumococcal 20 Valent Conjugate (Prevnar 20)  -     REVIEW OF HEALTH MAINTENANCE PROTOCOL ORDERS  -     INFLUENZA VACCINE IM > 6 MONTHS VALENT IIV4 (AFLURIA/FLUZONE)  -     PRIMARY CARE FOLLOW-UP SCHEDULING; Future  -     PRIMARY CARE FOLLOW-UP SCHEDULING; Future        Patient has been advised of split billing requirements and indicates understanding: Yes      COUNSELING:   Reviewed preventive health counseling, as reflected in patient instructions      BMI:   Estimated body mass index is 38.32 kg/m  as calculated from the following:    Height as of this encounter: 1.816 m (5' 11.5\").    Weight as of this encounter: 126.4 kg (278 lb 9.6 oz).   Weight management plan: Discussed healthy diet and exercise guidelines      He reports that he quit smoking about 13 years ago. His smoking use included cigarettes. He has never been exposed to tobacco smoke. He has quit using smokeless tobacco.  His smokeless tobacco use included chew.          Darin Evans MD  [facility]  "

## 2023-10-26 ENCOUNTER — MYC MEDICAL ADVICE (OUTPATIENT)
Dept: FAMILY MEDICINE | Facility: CLINIC | Age: 38
End: 2023-10-26
Payer: COMMERCIAL

## 2023-10-26 DIAGNOSIS — Z23 NEED FOR SHINGLES VACCINE: Primary | ICD-10-CM

## 2023-10-26 NOTE — TELEPHONE ENCOUNTER
FYI - Status Update    Who is Calling: patient    Update: Patient is calling in to get a shingrix vaccine scheduled, insurance covers it, we need an order to schedule it.  Please call patient to schedule     Does caller want a call/response back: Yes     Could we send this information to you in [employer] or would you prefer to receive a phone call?:   Patient would prefer a phone call   Okay to leave a detailed message?: Yes at Cell number on file:    Telephone Information:   Mobile [phone]

## 2023-10-27 NOTE — PATIENT INSTRUCTIONS
After Visit Instructions:     Thank you for coming to [facility] for your care. It is my goal to partner with you to help you reach your optimal state of health.       Plan:     Schedule follow-up with Leonor Awad PA-C in 3 months.   Labs: CBC, creatinine, AST, ALT and albumin every 3 months, will do this today then will be due again at the end of November   Medication recommendations:   Methotrexate: Continue 10mg (4 tablets) WEEKLY. While on Methotrexate:  -check labs (ALT/AST, albumin, CBC with platelets and creatinine) every 3 months  -Limit alcohol to 2 drinks weekly  -Take Folic Acid 2-3mg daily   -Tylenol 500-1000mg can be used as needed up to three times daily for nausea/headache associated with dosing        Leonor Awad PA-C  [facility]  [facility]    Contact information: [facility]  Clinic Number:  [phone]  Please call or send a [employer] message with any questions about your care    Patient

## 2023-11-01 NOTE — TELEPHONE ENCOUNTER
Ordered Shingrix as future order.  Covering for your clinician.  Thank you,  Los Benson MD  [facility]

## 2023-11-03 ENCOUNTER — ALLIED HEALTH/NURSE VISIT (OUTPATIENT)
Dept: FAMILY MEDICINE | Facility: CLINIC | Age: 38
End: 2023-11-03
Payer: COMMERCIAL

## 2023-11-03 DIAGNOSIS — Z23 NEED FOR SHINGLES VACCINE: ICD-10-CM

## 2023-11-03 DIAGNOSIS — Z23 NEED FOR VACCINATION: ICD-10-CM

## 2023-11-03 DIAGNOSIS — Z23 ENCOUNTER FOR IMMUNIZATION: Primary | ICD-10-CM

## 2023-11-03 DIAGNOSIS — M06.9 RHEUMATOID ARTHRITIS INVOLVING MULTIPLE JOINTS (H): ICD-10-CM

## 2023-11-03 PROCEDURE — 90750 HZV VACC RECOMBINANT IM: CPT

## 2023-11-03 PROCEDURE — 90471 IMMUNIZATION ADMIN: CPT

## 2023-11-03 PROCEDURE — 90715 TDAP VACCINE 7 YRS/> IM: CPT

## 2023-11-03 PROCEDURE — 99207 PR NO CHARGE NURSE ONLY: CPT

## 2023-11-03 PROCEDURE — 90472 IMMUNIZATION ADMIN EACH ADD: CPT

## 2023-11-03 RX ORDER — FOLIC ACID 1 MG/1
2 TABLET ORAL DAILY
Qty: 180 TABLET | Refills: 2 | Status: SHIPPED | OUTPATIENT
Start: 2023-11-03

## 2023-11-03 NOTE — TELEPHONE ENCOUNTER
Last OV 8/2023. Has follow up on 11/28/23.    Refilled per [facility].  Carrie George RN on 11/3/2023 at 8:16 AM

## 2023-11-03 NOTE — PROGRESS NOTES
Prior to immunization administration, verified patients identity using patient s name and date of birth. Please see Immunization Activity for additional information.     Screening Questionnaire for Adult Immunization    Are you sick today?   No   Do you have allergies to medications, food, a vaccine component or latex?   No   Have you ever had a serious reaction after receiving a vaccination?   No   Do you have a long-term health problem with heart, lung, kidney, or metabolic disease (e.g., diabetes), asthma, a blood disorder, no spleen, complement component deficiency, a cochlear implant, or a spinal fluid leak?  Are you on long-term aspirin therapy?   No   Do you have cancer, leukemia, HIV/AIDS, or any other immune system problem?   No   Do you have a parent, brother, or sister with an immune system problem?   No   In the past 3 months, have you taken medications that affect  your immune system, such as prednisone, other steroids, or anticancer drugs; drugs for the treatment of rheumatoid arthritis, Crohn s disease, or psoriasis; or have you had radiation treatments?   No   Have you had a seizure, or a brain or other nervous system problem?   No   During the past year, have you received a transfusion of blood or blood    products, or been given immune (gamma) globulin or antiviral drug?   No   For women: Are you pregnant or is there a chance you could become       pregnant during the next month?   No   Have you received any vaccinations in the past 4 weeks?   No     Immunization questionnaire answers were all negative.    I have reviewed the following standing orders:   This patient is due and qualifies for a TDAP vaccine.    Click here for Tdap Standing Order    I have reviewed the vaccines inclusion and exclusion criteria; No concerns regarding eligibility.         This patient is due and qualifies for the Zoster vaccine.    Click here for Zoster Standing Order    I have reviewed the vaccines inclusion and  exclusion criteria; No concerns regarding eligibility.     Patient instructed to remain in clinic for 15 minutes afterwards, and to report any adverse reactions.     Screening performed by Aleyda Mar CMA on 11/3/2023 at 11:16 AM.

## 2023-11-17 DIAGNOSIS — J30.2 SEASONAL ALLERGIC RHINITIS, UNSPECIFIED TRIGGER: ICD-10-CM

## 2023-11-20 RX ORDER — FLUTICASONE PROPIONATE 50 MCG
1 SPRAY, SUSPENSION (ML) NASAL DAILY
Qty: 48 ML | Refills: 1 | Status: SHIPPED | OUTPATIENT
Start: 2023-11-20

## 2023-12-05 NOTE — PROGRESS NOTES
Rheumatology Clinic Visit  [facility]  TRESSA Downey     Shady Maguire MRN# [id number]   YOB: 1985 Age: 38 year old   Date of Visit: 12/11/2023  Primary care provider: Hilary [facility]          Assessment and Plan:     1.  Rheumatoid arthritis  2.  High-risk medication use      Patient presents today for follow-up regarding his seropositive rheumatoid arthritis.  Unfortunately has been dealing with viral infections and has been having to hold his methotrexate due to these.  He states that he feels he is able to take the medication for 2 to 3 weeks consistently and then has to be off of it for couple of weeks while he has an infection.  This has been happening pretty consistently.  He does have a difficult time to tell if he has increased joint pain or if it is secondary to an infection as he may get achy and it may or may not be as associated in his joints.  We discussed different options today including stopping the methotrexate and switching him to sulfasalazine versus continuing on the methotrexate.  He would like to review his options and he will let me know if he would like to make this switch.  Will check labs today to monitor for any medication toxicity.  If he does make a switch to sulfasalazine we will check labs again in 1 month after starting the medication.  If he elects to continue on the methotrexate labs will be every 3 months.    Plan:   Schedule follow-up with Leonor Awad PA-C in 3 months.   Labs: CBC, creatinine, AST, ALT and albumin every 3 months, will do this today then will be due again mid March   Medication recommendations:   Methotrexate: Continue 10mg (4 tablets) WEEKLY. While on Methotrexate:  -check labs (ALT/AST, albumin, CBC with platelets and creatinine) every 3 months  -Limit alcohol to 2 drinks weekly  -Take Folic Acid 2-3mg daily   -Tylenol 500-1000mg can be used as needed up to three times daily for nausea/headache associated with  "dosing  Consider stopping the Methotrexate and starting Sulfasalazine.   You would Take 1 tablet daily for 1 week, then take 1 tablet twice daily for 1 week, then take 1 tab in AM and 2 tabs in PM for 1 week, then take 2 tabs twice daily. Take this medication with food.  # Sulfasalazine Risks and Benefits: The risks and benefits of sulfasalazine were discussed in detail and the patient verbalized understanding.  The risks discussed include, but are not limited to, the risk for hypersensitivity, anaphylaxis, anaphylactoid reactions, infections, bone marrow suppression,  hepatotoxicity, nausea, vomiting, and GI upset.  Oligospermia may occur in males.  I encouraged reviewing the package insert and asking any questions about the medication.     Leonor Awad, formerly [facility]  Rheumatology         History of Present Illness:   Shady Maguire presents for evaluation of polyarthritis.     Interval history December 11, 2023:  He currently has a cold. He is one week in and it has consisted of congestion and runny nose. Since mid-October, he has been doing 2-3 weeks off the Methotrexate as he is trying to recover from a cold. Then he takes it for another for a couple weeks,then gets another cold. He states that his joints are generally good. He has had episodes where he had joint pain.     Interval history August 29, 2023:  He has had some joint symptoms that he has noticed. He feels that he can usually explain them. He has continued on the 4 tablets of Methotrexate.     Interval history June 7, 2023:  Joints have been good. He has not had any unusual joint symptoms. If there has been increased joint pain, he can usual determine a trigger. He does usually take his Methotrexate on Thursdays, but has had some weeks that he forgets. He has been taking his Folic acid. The mouth sores are spotty and back to \"normal\".      Interval history October 25, 2022:  His joints have been doing well. He has not had trouble with morning stiffness in his " joints. He is tolerating the methotrexate. He takes his 4 tablets on Thursdays. He has been doing Folic Acid, 2mg. The mouth sores have decreased. He has not needed to use the Magic Mouthwash.     Interval history 7/19/2022:  He states that his joints have been pretty good. He states that he has had a couple mild episodes of joint pain, but nothing compared to what they were before. The knee is doing well also. No morning stiffness. He takes the Methotrexate on Thursdays. He did not have any side effects from the Methotrexate for the first couple weeks, but after he finished the prednisone, when he took the methotrexate, on Friday or Saturday he had multiple mouth sores. This caused him to not want to eat food, he did, but there was decreased motivation. There was pain with sores. He is taking Folic Acid daily. This happened 2 thursdays ago. When he took the methotrexate again, it did not happen again.     Consult visit from 6/8/2022:  He has been keeping track of pain that has started and where it has been. This initiaed after having knee pain that was severe. Xray was normal. 2 days later he was unable lift his left arm up. He then had blood work done and put together a list. He has had pain in multiple joints including right wrist, left knee, left elbow, multiple fingers, left wrist, hips, shoulders, knees and toes. In these joints he has had sharp pains with movements, some pain even with rest. Difficulty with gripping. The pains have lasted anywhere from 1/2 day to 3+ days. He started prednisone. He states that seemed to help awhile. He started it on 5/9/2022. Before that every other day something new would start. He had bout 1.5 weeks with no pain, missed a couple prednisone doses and got back on it. Since then, it is back to every couple days. He has not had any morning stiffness. He can usually feel things starting the night before. He can feel a pain starting and the next morning he has the pain in that  "joint. Over the last couple years he \"threw\" his back out twice. It has not been back to 100% since then. He is unsure if he has had joint swelling. He has asked his wife as well, but they have not noticed significant swelling. No fatigue. No shortness of breath, coughing or chest pain. No skin rashes or mouth sores. History of smoking, limited now.     No family history of RA or lupus. He has a brother with crohn's. No personal history of psoriasis, ulcerative colitis or crohn's.          Review of Systems:     Constitutional: negative  Skin: negative  Eyes: negative  Ears/Nose/Throat: negative  Respiratory: No shortness of breath, dyspnea on exertion, cough, or hemoptysis  Cardiovascular: negative  Gastrointestinal: negative  Genitourinary: negative  Musculoskeletal: As above  Neurologic: negative  Psychiatric: negative  Hematologic/Lymphatic/Immunologic: negative  Endocrine: negative         Active Problem List:     Patient Active Problem List    Diagnosis Date Noted    Rheumatoid arthritis (H) 10/25/2023     Priority: Medium    Acute midline low back pain without sciatica 10/14/2019     Priority: Medium    Obesity (BMI 35.0-39.9) with comorbidity (H) 10/14/2019     Priority: Medium            Past Medical History:   No past medical history on file.  Past Surgical History:   Procedure Laterality Date    VASECTOMY Bilateral 2023            Social History:     Social History     Socioeconomic History    Marital status:      Spouse name: Not on file    Number of children: Not on file    Years of education: Not on file    Highest education level: Not on file   Occupational History    Not on file   Tobacco Use    Smoking status: Former     Packs/day: 0     Types: Cigarettes     Quit date: 2010     Years since quittin.9     Passive exposure: Never    Smokeless tobacco: Former     Types: Chew    Tobacco comments:     Smoked for a year while in college-only a social smoker   Vaping Use    Vaping " Use: Never used   Substance and Sexual Activity    Alcohol use: Yes     Comment: occasional    Drug use: Not Currently    Sexual activity: Yes     Partners: Female   Other Topics Concern    Not on file   Social History Narrative    January 28, 2022    ENVIRONMENTAL HISTORY: [place] The home is heated with a forced air. They do have central air conditioning. The patient's bedroom is furnished with hard malinda in bedroom.  No pets. There is no history of cockroach or mice infestation. There is/are 0 smokers in the house.  The house does not have a damp basement. Noticed mold growth on basement window ki.     Social Determinants of Health     Financial Resource Strain: Low Risk  (10/25/2023)    Financial Resource Strain     Within the past 12 months, have you or your family members you live with been unable to get utilities (heat, electricity) when it was really needed?: No   Food Insecurity: Low Risk  (10/25/2023)    Food Insecurity     Within the past 12 months, did you worry that your food would run out before you got money to buy more?: No     Within the past 12 months, did the food you bought just not last and you didn t have money to get more?: No   Transportation Needs: Low Risk  (10/25/2023)    Transportation Needs     Within the past 12 months, has lack of transportation kept you from medical appointments, getting your medicines, non-medical meetings or appointments, work, or from getting things that you need?: No   Physical Activity: Not on file   Stress: Not on file   Social Connections: Not on file   Interpersonal Safety: Low Risk  (10/25/2023)    Interpersonal Safety     Do you feel physically and emotionally safe where you currently live?: Yes     Within the past 12 months, have you been hit, slapped, kicked or otherwise physically hurt by someone?: No     Within the past 12 months, have you been humiliated or emotionally abused in other ways by your  partner or ex-partner?: No   Housing Stability: Low Risk  (10/25/2023)    Housing Stability     Do you have housing? : Yes     Are you worried about losing your housing?: No          Family History:     Family History   Problem Relation Age of Onset    Hypertension Father     Allergies Brother         Anesthesia    Allergies Brother         Bees    Crohn's Disease Brother     Heart Disease Maternal Grandmother     Cancer Maternal Grandfather         skin ca    Heart Disease Maternal Grandfather     Heart Disease Paternal Grandmother     Heart Disease Paternal Grandfather             Allergies:   No Known Allergies         Medications:     Current Outpatient Medications   Medication Sig Dispense Refill    BIOTIN PO Take by mouth daily      fluticasone (FLONASE) 50 MCG/ACT nasal spray INSTILL 1 SPRAY INTO BOTH NOSTRILS DAILY 48 mL 1    folic acid (FOLVITE) 1 MG tablet Take 2 tablets (2 mg) by mouth daily 180 tablet 2    ibuprofen (ADVIL/MOTRIN) 200 MG tablet Take 200 mg by mouth      methocarbamol (ROBAXIN) 750 MG tablet 1 tab by mouth every 4 hrs. or 2 tabs every 8 hrs. as needed for muscle spasm. 30 tablet 1    methotrexate 2.5 MG tablet Take 4 tablets (10 mg) by mouth every 7 days Labs every 8-12 weeks. 48 tablet 0    Multiple Vitamin (MULTIVITAMIN ADULT PO) Take by mouth daily      olopatadine (PATADAY) 0.2 % ophthalmic solution Place 1 drop into both eyes daily Using prn      prednisoLONE acetate (PRED FORTE) 1 % ophthalmic suspension INSTILL 1 DROP INTO LEFT EYE 4 TIMES DAILY FOR 1 WEEK THEN TWICE DAILY FOR 1 WEEK      vitamin C with B complex (B COMPLEX-C) tablet Take 1 tablet by mouth daily              Physical Exam:   Blood pressure (!) 144/97, pulse 87, temperature 98.8  F (37.1  C), temperature source Tympanic, resp. rate 18, weight 127 kg (280 lb), SpO2 97%.  Wt Readings from Last 6 Encounters:   12/11/23 127 kg (280 lb)   10/25/23 126.4 kg (278 lb 9.6 oz)   08/29/23 126 kg (277 lb 12.8 oz)   06/06/23  124.6 kg (274 lb 9.6 oz)   04/19/23 122.9 kg (271 lb)   10/25/22 121.1 kg (267 lb)     Constitutional: well-developed, appearing stated age; cooperative  Eyes: nl conjunctiva, sclera  ENT: nl external ears, hearing  Neck: no mass or thyroid enlargement  Resp: No shortness of breath with normal conversation  Psych: nl judgement, orientation, memory, affect.           Data:   Imaging:  Xray left knee 4/14/2022  Impression:     1.  Normal left knee. Normal joint alignment and spacing. No fracture  or bone lesion. No significant joint effusion.    6/8/2022 x-ray bilateral hands and wrists  IMPRESSION: No erosion or joint space narrowing. No soft tissue  swelling. Normal mineralization. Normal alignment.    Laboratory:  4/22/2022  CRP <2.9    Rheumatoid factor 31  Sed rate 7  Lyme 0.71  TIMOTHY negative    6/8/2022  Creatinine 1.01, GFR greater than 90  ALT 34, AST 15  CBC within normal limits    7/19/2022  Creatinine 0.89, GFR greater than 90  Albumin 4.2  ALT 47, AST 22  CBC within normal limits    8/19/2022   CBC within normal limits  Creatinine 0.90, GFR greater than 90  Albumin 4.2  ALT 50, AST 28    3/27/2023  Creatinine 1.01, GFR >90  Albumin 4.6  ALT 50, AST 32  White blood cell count 5.9, hemoglobin 14.8, platelet count 263    6/6/2023  Creatinine 1.08, GFR greater than 90  Albumin 4.6  ALT 49, AST 31  White blood cell count 5.1, hemoglobin 14.4, platelet count 282    8/29/2023  Creatinine 0.93, GFR greater than 90  Albumin 4.8  ALT 46, AST 26  White blood cell count 6.9, hemoglobin 14.6, platelet count 277

## 2023-12-11 ENCOUNTER — OFFICE VISIT (OUTPATIENT)
Dept: RHEUMATOLOGY | Facility: CLINIC | Age: 38
End: 2023-12-11
Payer: COMMERCIAL

## 2023-12-11 VITALS
TEMPERATURE: 98.8 F | BODY MASS INDEX: 38.51 KG/M2 | WEIGHT: 280 LBS | HEART RATE: 87 BPM | OXYGEN SATURATION: 97 % | RESPIRATION RATE: 18 BRPM | SYSTOLIC BLOOD PRESSURE: 144 MMHG | DIASTOLIC BLOOD PRESSURE: 97 MMHG

## 2023-12-11 DIAGNOSIS — Z79.899 HIGH RISK MEDICATION USE: ICD-10-CM

## 2023-12-11 DIAGNOSIS — M06.9 RHEUMATOID ARTHRITIS INVOLVING MULTIPLE JOINTS (H): Primary | ICD-10-CM

## 2023-12-11 DIAGNOSIS — R76.8 CYCLIC CITRULLINATED PEPTIDE (CCP) ANTIBODY POSITIVE: ICD-10-CM

## 2023-12-11 LAB
ALBUMIN SERPL BCG-MCNC: 4.7 G/DL (ref 3.5–5.2)
ALT SERPL W P-5'-P-CCNC: 38 U/L (ref 0–70)
AST SERPL W P-5'-P-CCNC: 28 U/L (ref 0–45)
CREAT SERPL-MCNC: 1 MG/DL (ref 0.67–1.17)
EGFRCR SERPLBLD CKD-EPI 2021: >90 ML/MIN/1.73M2
ERYTHROCYTE [DISTWIDTH] IN BLOOD BY AUTOMATED COUNT: 12.2 % (ref 10–15)
HCT VFR BLD AUTO: 42.8 % (ref 40–53)
HGB BLD-MCNC: 14.2 G/DL (ref 13.3–17.7)
MCH RBC QN AUTO: 28.4 PG (ref 26.5–33)
MCHC RBC AUTO-ENTMCNC: 33.2 G/DL (ref 31.5–36.5)
MCV RBC AUTO: 86 FL (ref 78–100)
PLATELET # BLD AUTO: 298 10E3/UL (ref 150–450)
RBC # BLD AUTO: 5 10E6/UL (ref 4.4–5.9)
WBC # BLD AUTO: 9.3 10E3/UL (ref 4–11)

## 2023-12-11 PROCEDURE — 82565 ASSAY OF CREATININE: CPT | Performed by: PHYSICIAN ASSISTANT

## 2023-12-11 PROCEDURE — 36415 COLL VENOUS BLD VENIPUNCTURE: CPT | Performed by: PHYSICIAN ASSISTANT

## 2023-12-11 PROCEDURE — 84460 ALANINE AMINO (ALT) (SGPT): CPT | Performed by: PHYSICIAN ASSISTANT

## 2023-12-11 PROCEDURE — 85027 COMPLETE CBC AUTOMATED: CPT | Performed by: PHYSICIAN ASSISTANT

## 2023-12-11 PROCEDURE — 82040 ASSAY OF SERUM ALBUMIN: CPT | Performed by: PHYSICIAN ASSISTANT

## 2023-12-11 PROCEDURE — 99214 OFFICE O/P EST MOD 30 MIN: CPT | Performed by: PHYSICIAN ASSISTANT

## 2023-12-11 PROCEDURE — 84450 TRANSFERASE (AST) (SGOT): CPT | Performed by: PHYSICIAN ASSISTANT

## 2023-12-11 RX ORDER — PREDNISOLONE ACETATE 10 MG/ML
SUSPENSION/ DROPS OPHTHALMIC
COMMUNITY
Start: 2023-10-31 | End: 2024-07-23

## 2023-12-11 NOTE — PATIENT INSTRUCTIONS
After Visit Instructions:     Thank you for coming to [facility] for your care. It is my goal to partner with you to help you reach your optimal state of health.       Plan:     Schedule follow-up with Leonor Awad PA-C in 3 months.   Labs: CBC, creatinine, AST, ALT and albumin every 3 months, will do this today then will be due again mid March   Medication recommendations:   Methotrexate: Continue 10mg (4 tablets) WEEKLY. While on Methotrexate:  -check labs (ALT/AST, albumin, CBC with platelets and creatinine) every 3 months  -Limit alcohol to 2 drinks weekly  -Take Folic Acid 2-3mg daily   -Tylenol 500-1000mg can be used as needed up to three times daily for nausea/headache associated with dosing  Consider stopping the Methotrexate and starting Sulfasalazine.   You would Take 1 tablet daily for 1 week, then take 1 tablet twice daily for 1 week, then take 1 tab in AM and 2 tabs in PM for 1 week, then take 2 tabs twice daily. Take this medication with food.  # Sulfasalazine Risks and Benefits: The risks and benefits of sulfasalazine were discussed in detail and the patient verbalized understanding.  The risks discussed include, but are not limited to, the risk for hypersensitivity, anaphylaxis, anaphylactoid reactions, infections, bone marrow suppression,  hepatotoxicity, nausea, vomiting, and GI upset.  Oligospermia may occur in males.  I encouraged reviewing the package insert and asking any questions about the medication.          Leonor Awad PA-C  [facility]  [facility]    Contact information: [facility]  Clinic Number:  [phone]  Please call or send a [facility] message with any questions about your care

## 2023-12-11 NOTE — NURSING NOTE
Chief Complaint   Patient presents with    Rheumatoid Arthritis     4 month recheck       Vitals:    12/11/23 1051   Pulse: 87   Resp: 18   SpO2: 97%   Weight: 127 kg (280 lb)     Wt Readings from Last 1 Encounters:   12/11/23 127 kg (280 lb)     Angelina Whitley MA

## 2024-02-16 ENCOUNTER — MYC MEDICAL ADVICE (OUTPATIENT)
Dept: FAMILY MEDICINE | Facility: CLINIC | Age: 39
End: 2024-02-16
Payer: COMMERCIAL

## 2024-02-16 ENCOUNTER — HOSPITAL ENCOUNTER (EMERGENCY)
Facility: CLINIC | Age: 39
Discharge: HOME | End: 2024-02-16
Attending: PHYSICIAN ASSISTANT | Admitting: PHYSICIAN ASSISTANT
Payer: COMMERCIAL

## 2024-02-16 VITALS
RESPIRATION RATE: 16 BRPM | HEART RATE: 82 BPM | DIASTOLIC BLOOD PRESSURE: 91 MMHG | OXYGEN SATURATION: 98 % | TEMPERATURE: 97.8 F | SYSTOLIC BLOOD PRESSURE: 150 MMHG

## 2024-02-16 DIAGNOSIS — J11.1 INFLUENZA-LIKE ILLNESS: ICD-10-CM

## 2024-02-16 DIAGNOSIS — K11.21 PAROTITIS, ACUTE: ICD-10-CM

## 2024-02-16 PROCEDURE — G0463 HOSPITAL OUTPT CLINIC VISIT: HCPCS | Performed by: PHYSICIAN ASSISTANT

## 2024-02-16 PROCEDURE — 99213 OFFICE O/P EST LOW 20 MIN: CPT | Performed by: PHYSICIAN ASSISTANT

## 2024-02-16 RX ORDER — PREDNISONE 20 MG/1
TABLET ORAL
Qty: 5 TABLET | Refills: 0 | Status: SHIPPED | OUTPATIENT
Start: 2024-02-16 | End: 2024-03-26

## 2024-02-16 ASSESSMENT — ENCOUNTER SYMPTOMS
TROUBLE SWALLOWING: 0
SINUS PAIN: 0
RHINORRHEA: 1
VOICE CHANGE: 0
SINUS PRESSURE: 0
EYES NEGATIVE: 1
SORE THROAT: 0
FACIAL SWELLING: 1
RESPIRATORY NEGATIVE: 1

## 2024-02-16 NOTE — ED TRIAGE NOTES
Pt reports left sided facial swelling with jaw pain , pt spoke with nurse triage line today

## 2024-02-16 NOTE — DISCHARGE INSTRUCTIONS
Warm compresses to the left face and jaw.     Tylenol/ibuprofen over the counter as needed.     Use antibiotic and prednisone as directed in the next 24 hours if no improvement of symptoms with warm compress and tylenol.     Return to the Emergency Room if difficulty opening mouth, change in voice, difficulty swallowing, fevers, redness to face, or swelling down into the neck.

## 2024-02-16 NOTE — ED PROVIDER NOTES
History     Chief Complaint   Patient presents with    Facial Swelling     HPI  Shady Maguire is a 38 year old male who presents today with left facial swelling and pain that started a few days after viral influenza like illness. Patient states initial symptoms of fever, runny nose congestion, body aches and occasional cough for 3 to 4 days.  Symptoms since resolved however he noticed left facial swelling and tenderness since then.  He denies difficulty swallowing, change in voice, difficulty opening mouth and no neck pain. Family members with similar viral illness, but no facial swelling. Patient is up to date with vaccines.     Allergies:  No Known Allergies    Problem List:    Patient Active Problem List    Diagnosis Date Noted    Rheumatoid arthritis (H) 10/25/2023     Priority: Medium    Acute midline low back pain without sciatica 10/14/2019     Priority: Medium    Obesity (BMI 35.0-39.9) with comorbidity (H) 10/14/2019     Priority: Medium        Past Medical History:    No past medical history on file.    Past Surgical History:    Past Surgical History:   Procedure Laterality Date    VASECTOMY Bilateral 2023       Family History:    Family History   Problem Relation Age of Onset    Hypertension Father     Allergies Brother         Anesthesia    Allergies Brother         Bees    Crohn's Disease Brother     Heart Disease Maternal Grandmother     Cancer Maternal Grandfather         skin ca    Heart Disease Maternal Grandfather     Heart Disease Paternal Grandmother     Heart Disease Paternal Grandfather        Social History:  Marital Status:   [2]  Social History     Tobacco Use    Smoking status: Former     Packs/day: 0     Types: Cigarettes     Quit date: 2010     Years since quittin.1     Passive exposure: Never    Smokeless tobacco: Former     Types: Chew    Tobacco comments:     Smoked for a year while in college-only a social smoker   Vaping Use    Vaping Use: Never used   Substance  Use Topics    Alcohol use: Yes     Comment: occasional    Drug use: Not Currently        Medications:    amoxicillin-clavulanate (AUGMENTIN) 875-125 MG tablet  methotrexate 2.5 MG tablet  predniSONE (DELTASONE) 20 MG tablet  BIOTIN PO  fluticasone (FLONASE) 50 MCG/ACT nasal spray  folic acid (FOLVITE) 1 MG tablet  ibuprofen (ADVIL/MOTRIN) 200 MG tablet  methocarbamol (ROBAXIN) 750 MG tablet  Multiple Vitamin (MULTIVITAMIN ADULT PO)  olopatadine (PATADAY) 0.2 % ophthalmic solution  prednisoLONE acetate (PRED FORTE) 1 % ophthalmic suspension  vitamin C with B complex (B COMPLEX-C) tablet          Review of Systems   HENT:  Positive for congestion, facial swelling and rhinorrhea. Negative for sinus pressure, sinus pain, sore throat, tinnitus, trouble swallowing and voice change.    Eyes: Negative.    Respiratory: Negative.     All other systems reviewed and are negative.      Physical Exam   BP: (!) 150/91  Pulse: 82  Temp: 97.8  F (36.6  C)  Resp: 16  SpO2: 98 %      Physical Exam  Vitals and nursing note reviewed.   Constitutional:       General: He is not in acute distress.     Appearance: Normal appearance. He is not ill-appearing or toxic-appearing.   HENT:      Right Ear: Tympanic membrane and ear canal normal.      Left Ear: Tympanic membrane and ear canal normal.      Nose: Nose normal.      Mouth/Throat:      Mouth: Mucous membranes are moist.      Pharynx: No oropharyngeal exudate or posterior oropharyngeal erythema.      Comments: No dysphonia, dysphagia, or trismus  Eyes:      General: No scleral icterus.     Extraocular Movements: Extraocular movements intact.      Conjunctiva/sclera: Conjunctivae normal.      Pupils: Pupils are equal, round, and reactive to light.   Neck:      Vascular: No carotid bruit.   Cardiovascular:      Rate and Rhythm: Normal rate and regular rhythm.      Heart sounds: Normal heart sounds.   Pulmonary:      Effort: Pulmonary effort is normal.      Breath sounds: Normal breath  sounds.   Musculoskeletal:         General: Normal range of motion.      Cervical back: Normal range of motion and neck supple. Tenderness (To the left parotid gland and submandibular gland) present. No rigidity.   Lymphadenopathy:      Cervical: Cervical adenopathy present.   Skin:     General: Skin is warm.      Capillary Refill: Capillary refill takes less than 2 seconds.      Findings: Erythema (Mild erythema over the left parotid gland) present.   Neurological:      General: No focal deficit present.      Mental Status: He is alert and oriented to person, place, and time.   Psychiatric:         Mood and Affect: Mood normal.         Behavior: Behavior normal.         Thought Content: Thought content normal.         Judgment: Judgment normal.         ED Course                 Procedures           Critical Care time:                 No results found for this or any previous visit (from the past 24 hour(s)).    Medications - No data to display    Assessments & Plan (with Medical Decision Making)     I have reviewed the nursing notes.    I have reviewed the findings, diagnosis, plan and need for follow up with the patient.   Shady Maguire is a 38 year old male who presents today with left facial swelling and pain that started a few days after viral influenza like illness. Patient states initial symptoms of fever, runny nose congestion, body aches and occasional cough for 3 to 4 days.  Symptoms since resolved however he noticed left facial swelling and tenderness since then.  He denies difficulty swallowing, change in voice, difficulty opening mouth and no neck pain. Family members with similar viral illness, but no facial swelling. Patient is up to date with vaccines.    Discussed and offered influenza and COVID testing today however since patient is outside of the treatment window for these illnesses and does not change the treatment course for his current symptoms we decided to hold off.  Influenza and COVID are  going around right now and influenza can cause a parotid swelling and lymph node swelling.  I suspect that he had influenza illness recently.  Due to the facial swelling and and erythema over the parotid gland and submandibular gland we will treat with Augmentin twice daily for 7 days and short course of oral prednisone.  Recommend warm compresses to the area Tylenol and ibuprofen.  Patient in agreement this plan and discharged in stable condition.    Discharge Medication List as of 2/16/2024  4:19 PM        START taking these medications    Details   amoxicillin-clavulanate (AUGMENTIN) 875-125 MG tablet Take 1 tablet by mouth 2 times daily for 7 days, Disp-14 tablet, R-0, E-Prescribe      predniSONE (DELTASONE) 20 MG tablet Take one tablet (20mg) each day for 5 (five) days, Disp-5 tablet, R-0, E-Prescribe             Final diagnoses:   Influenza-like illness   Parotitis, acute - left        2/16/2024   [facility] EMERGENCY DEPT       Svitlana Laguna PA-C  02/16/24 [id number]

## 2024-02-16 NOTE — TELEPHONE ENCOUNTER
Pt returned call.  States flu/cold sx started on Sat, 2/10.  Wednesday noticed if was difficult to bite down on left side & left jaw hurt.   Thursday travis noticed left jaw/facial swelling from chin,neck, jaw, cheek up to ear.  Now can bite down & chew without pain. No pain inside mouth, along gums or teeth.  Swelling may be slight worse today. No redness, feels warm.  States doesn't feel like he has had a temp. No other sx.  Denies breathing/swallowing problems.    Spoke with [facility] RN, Jessica. She will review with provider.  Jessica returned msg & stated they could see pt in [facility] if he can get there now. Pt cannot get here for 45 min, home with kids & waiting for wife to get home. Updated [facility]. Cannot see pt. Advised to refer to UC. Updated pt & he understands & verbalized understanding & will go to UC today.    Julia Mosquera RN

## 2024-02-16 NOTE — TELEPHONE ENCOUNTER
Left message on answering machine for patient to call back.   And sent kSARIA message.    Thank you    Marleny LONG RN

## 2024-02-20 NOTE — TELEPHONE ENCOUNTER
Left message for patient to return call to clinic RN.     Kayla Ramesh RN  [facility]

## 2024-02-20 NOTE — TELEPHONE ENCOUNTER
Pt returns call. Says that he feels like the jaw pain and swelling are starting to improve today. States that he's still on Augmentin and prednisone; started both meds on Saturday 2/17. Reports that he has missed a couple of doses of the antibiotic. Pt doesn't feel like he needs to be seen today; however is concerned about the possibility of recurrence of symptoms, particularly d/t history of RA. Pt is scheduled for a clinic appt for tomorrow 2/21, and is advised that he can cancel the appt if he feels that he's continuing to improve. Also advised that if recurrence of severe pain, swelling, etc in the meantime, should be seen in ED/UC ASAP. He verbalized understanding, and is agreeable to this plan.    Kayla Ramesh RN  [facility]

## 2024-03-25 NOTE — PROGRESS NOTES
Rheumatology Clinic Visit  [facility]  TRESSA Downey     Shady Maguire MRN# [id number]   YOB: 1985 Age: 38 year old   Date of Visit: 3/26/2024  Primary care provider: [facility]          Assessment and Plan:     1.  Rheumatoid arthritis  2.  High-risk medication use      Patient presents today for follow-up regarding his seropositive rheumatoid arthritis. He had 2 viral infections at the beginning of this year. He stopped the Methotrexate and has not had any flares.  He would like to stay off medications for the time being as he has not noticed any significant joint symptoms.  Physical examination today did not show any active synovitis or dactylitis.  He has full fist formation.  His previous hand x-rays from June 2022 did not show any erosions.  We did discuss that he is seropositive which does increase his risk for developing damage to his joints.  We will continue to monitor.  At this time we will have him follow-up with me in 4 months.  If he is not having any new symptoms we can get x-rays to verify that there is no new erosions.  I did encourage him to reach out though if he is starting to become symptomatic and we can restart treatment.  I do not need any labs while he is not on any treatment.    Plan:   Schedule follow-up with Leonor Awad PA-C in 4 months.   Labs: No labs needed if you're off medication    TRESSA Downey  Rheumatology         History of Present Illness:   Shady Maguire presents for evaluation of polyarthritis.     Interval history March 26, 2024:  He had influenza in February. It was accompanied with parotid glad swelling. It was severe pain as well. Had a day where he was unable to chew food. He was given an antibiotic with urgent care. It resolved and then his family got the virus. Then he got another viral infection. He stopped methotrexate during this time. He did not restart the medication and has not had a flare.     He does have  "a tightness in his right forefinger and left middle finger. He cracks those fingers.     Interval history December 11, 2023:  He currently has a cold. He is one week in and it has consisted of congestion and runny nose. Since mid-October, he has been doing 2-3 weeks off the Methotrexate as he is trying to recover from a cold. Then he takes it for another for a couple weeks,then gets another cold. He states that his joints are generally good. He has had episodes where he had joint pain.     Interval history August 29, 2023:  He has had some joint symptoms that he has noticed. He feels that he can usually explain them. He has continued on the 4 tablets of Methotrexate.     Interval history June 7, 2023:  Joints have been good. He has not had any unusual joint symptoms. If there has been increased joint pain, he can usual determine a trigger. He does usually take his Methotrexate on Thursdays, but has had some weeks that he forgets. He has been taking his Folic acid. The mouth sores are spotty and back to \"normal\".      Interval history October 25, 2022:  His joints have been doing well. He has not had trouble with morning stiffness in his joints. He is tolerating the methotrexate. He takes his 4 tablets on Thursdays. He has been doing Folic Acid, 2mg. The mouth sores have decreased. He has not needed to use the Magic Mouthwash.     Interval history 7/19/2022:  He states that his joints have been pretty good. He states that he has had a couple mild episodes of joint pain, but nothing compared to what they were before. The knee is doing well also. No morning stiffness. He takes the Methotrexate on Thursdays. He did not have any side effects from the Methotrexate for the first couple weeks, but after he finished the prednisone, when he took the methotrexate, on Friday or Saturday he had multiple mouth sores. This caused him to not want to eat food, he did, but there was decreased motivation. There was pain with sores. He " "is taking Folic Acid daily. This happened 2 thursdays ago. When he took the methotrexate again, it did not happen again.     Consult visit from 6/8/2022:  He has been keeping track of pain that has started and where it has been. This initiated after having knee pain that was severe. Xray was normal. 2 days later he was unable lift his left arm up. He then had blood work done and put together a list. He has had pain in multiple joints including right wrist, left knee, left elbow, multiple fingers, left wrist, hips, shoulders, knees and toes. In these joints he has had sharp pains with movements, some pain even with rest. Difficulty with gripping. The pains have lasted anywhere from 1/2 day to 3+ days. He started prednisone. He states that seemed to help awhile. He started it on 5/9/2022. Before that every other day something new would start. He had bout 1.5 weeks with no pain, missed a couple prednisone doses and got back on it. Since then, it is back to every couple days. He has not had any morning stiffness. He can usually feel things starting the night before. He can feel a pain starting and the next morning he has the pain in that joint. Over the last couple years he \"threw\" his back out twice. It has not been back to 100% since then. He is unsure if he has had joint swelling. He has asked his wife as well, but they have not noticed significant swelling. No fatigue. No shortness of breath, coughing or chest pain. No skin rashes or mouth sores. History of smoking, limited now.     No family history of RA or lupus. He has a brother with crohn's. No personal history of psoriasis, ulcerative colitis or crohn's.          Review of Systems:     Constitutional: negative  Skin: negative  Eyes: negative  Ears/Nose/Throat: negative  Respiratory: No shortness of breath, dyspnea on exertion, cough, or hemoptysis  Cardiovascular: negative  Gastrointestinal: negative  Genitourinary: negative  Musculoskeletal: As " above  Neurologic: negative  Psychiatric: negative  Hematologic/Lymphatic/Immunologic: negative  Endocrine: negative         Active Problem List:     Patient Active Problem List    Diagnosis Date Noted    Rheumatoid arthritis (H) 10/25/2023     Priority: Medium    Acute midline low back pain without sciatica 10/14/2019     Priority: Medium    Obesity (BMI 35.0-39.9) with comorbidity (H) 10/14/2019     Priority: Medium            Past Medical History:   No past medical history on file.  Past Surgical History:   Procedure Laterality Date    VASECTOMY Bilateral 2023            Social History:     Social History     Socioeconomic History    Marital status:      Spouse name: Not on file    Number of children: Not on file    Years of education: Not on file    Highest education level: Not on file   Occupational History    Not on file   Tobacco Use    Smoking status: Former     Packs/day: 0     Types: Cigarettes     Quit date: 2010     Years since quittin.2     Passive exposure: Never    Smokeless tobacco: Former     Types: Chew    Tobacco comments:     Smoked for a year while in college-only a social smoker   Vaping Use    Vaping Use: Never used   Substance and Sexual Activity    Alcohol use: Yes     Comment: occasional    Drug use: Not Currently    Sexual activity: Yes     Partners: Female   Other Topics Concern    Not on file   Social History Narrative    2022    ENVIRONMENTAL HISTORY: [place] The home is heated with a forced air. They do have central air conditioning. The patient's bedroom is furnished with hard malinda in bedroom.  No pets. There is no history of cockroach or mice infestation. There is/are 0 smokers in the house.  The house does not have a damp basement. Noticed mold growth on basement window ki.     Social Determinants of Health     Financial Resource Strain: Low Risk  (10/25/2023)    Financial Resource Strain      Within the past 12 months, have you or your family members you live with been unable to get utilities (heat, electricity) when it was really needed?: No   Food Insecurity: Low Risk  (10/25/2023)    Food Insecurity     Within the past 12 months, did you worry that your food would run out before you got money to buy more?: No     Within the past 12 months, did the food you bought just not last and you didn t have money to get more?: No   Transportation Needs: Low Risk  (10/25/2023)    Transportation Needs     Within the past 12 months, has lack of transportation kept you from medical appointments, getting your medicines, non-medical meetings or appointments, work, or from getting things that you need?: No   Physical Activity: Not on file   Stress: Not on file   Social Connections: Not on file   Interpersonal Safety: Low Risk  (10/25/2023)    Interpersonal Safety     Do you feel physically and emotionally safe where you currently live?: Yes     Within the past 12 months, have you been hit, slapped, kicked or otherwise physically hurt by someone?: No     Within the past 12 months, have you been humiliated or emotionally abused in other ways by your partner or ex-partner?: No   Housing Stability: Low Risk  (10/25/2023)    Housing Stability     Do you have housing? : Yes     Are you worried about losing your housing?: No          Family History:     Family History   Problem Relation Age of Onset    Hypertension Father     Allergies Brother         Anesthesia    Allergies Brother         Bees    Crohn's Disease Brother     Heart Disease Maternal Grandmother     Cancer Maternal Grandfather         skin ca    Heart Disease Maternal Grandfather     Heart Disease Paternal Grandmother     Heart Disease Paternal Grandfather             Allergies:   No Known Allergies         Medications:     Current Outpatient Medications   Medication Sig Dispense Refill    ibuprofen (ADVIL/MOTRIN) 200 MG tablet Take 200 mg by mouth      BIOTIN  "PO Take by mouth daily (Patient not taking: Reported on 3/26/2024)      fluticasone (FLONASE) 50 MCG/ACT nasal spray INSTILL 1 SPRAY INTO BOTH NOSTRILS DAILY (Patient not taking: Reported on 3/26/2024) 48 mL 1    folic acid (FOLVITE) 1 MG tablet Take 2 tablets (2 mg) by mouth daily (Patient not taking: Reported on 3/26/2024) 180 tablet 2    methocarbamol (ROBAXIN) 750 MG tablet 1 tab by mouth every 4 hrs. or 2 tabs every 8 hrs. as needed for muscle spasm. (Patient not taking: Reported on 3/26/2024) 30 tablet 1    methotrexate 2.5 MG tablet Take 4 tablets (10 mg) by mouth every 7 days Labs every 8-12 weeks. (Patient not taking: Reported on 3/26/2024) 48 tablet 0    Multiple Vitamin (MULTIVITAMIN ADULT PO) Take by mouth daily (Patient not taking: Reported on 3/26/2024)      olopatadine (PATADAY) 0.2 % ophthalmic solution Place 1 drop into both eyes daily Using prn (Patient not taking: Reported on 3/26/2024)      prednisoLONE acetate (PRED FORTE) 1 % ophthalmic suspension INSTILL 1 DROP INTO LEFT EYE 4 TIMES DAILY FOR 1 WEEK THEN TWICE DAILY FOR 1 WEEK (Patient not taking: Reported on 3/26/2024)      predniSONE (DELTASONE) 20 MG tablet Take one tablet (20mg) each day for 5 (five) days (Patient not taking: Reported on 3/26/2024) 5 tablet 0    vitamin C with B complex (B COMPLEX-C) tablet Take 1 tablet by mouth daily (Patient not taking: Reported on 3/26/2024)              Physical Exam:   Blood pressure 131/86, pulse 76, resp. rate 18, height 1.816 m (5' 11.5\"), weight 125.8 kg (277 lb 6.4 oz), SpO2 96%.  Wt Readings from Last 6 Encounters:   12/11/23 127 kg (280 lb)   10/25/23 126.4 kg (278 lb 9.6 oz)   08/29/23 126 kg (277 lb 12.8 oz)   06/06/23 124.6 kg (274 lb 9.6 oz)   04/19/23 122.9 kg (271 lb)   10/25/22 121.1 kg (267 lb)     Constitutional: well-developed, appearing stated age; cooperative  Eyes: nl conjunctiva, sclera  ENT: nl external ears, hearing  Neck: no mass or thyroid enlargement  Resp: No shortness of " breath with normal conversation  MSK: No active synovitis or tenderness to palpation. Full fist formation.   Skin: dry skin on hands  Psych: nl judgement, orientation, memory, affect.           Data:   Imaging:  Xray left knee 4/14/2022  Impression:     1.  Normal left knee. Normal joint alignment and spacing. No fracture  or bone lesion. No significant joint effusion.    6/8/2022 x-ray bilateral hands and wrists  IMPRESSION: No erosion or joint space narrowing. No soft tissue  swelling. Normal mineralization. Normal alignment.    Laboratory:  4/22/2022  CRP <2.9    Rheumatoid factor 31  Sed rate 7  Lyme 0.71  TIMOTHY negative    6/8/2022  Creatinine 1.01, GFR greater than 90  ALT 34, AST 15  CBC within normal limits    7/19/2022  Creatinine 0.89, GFR greater than 90  Albumin 4.2  ALT 47, AST 22  CBC within normal limits    8/19/2022   CBC within normal limits  Creatinine 0.90, GFR greater than 90  Albumin 4.2  ALT 50, AST 28    3/27/2023  Creatinine 1.01, GFR >90  Albumin 4.6  ALT 50, AST 32  White blood cell count 5.9, hemoglobin 14.8, platelet count 263    6/6/2023  Creatinine 1.08, GFR greater than 90  Albumin 4.6  ALT 49, AST 31  White blood cell count 5.1, hemoglobin 14.4, platelet count 282    8/29/2023  Creatinine 0.93, GFR greater than 90  Albumin 4.8  ALT 46, AST 26  White blood cell count 6.9, hemoglobin 14.6, platelet count 277    12/11/2023  Creatinine 1.00, GFR >90  Albumin 4.7  ALT 38, AST 28  WBC 9.3, hgb 14.2, plt 298

## 2024-03-26 ENCOUNTER — OFFICE VISIT (OUTPATIENT)
Dept: RHEUMATOLOGY | Facility: CLINIC | Age: 39
End: 2024-03-26
Payer: COMMERCIAL

## 2024-03-26 VITALS
SYSTOLIC BLOOD PRESSURE: 131 MMHG | HEIGHT: 72 IN | WEIGHT: 277.4 LBS | BODY MASS INDEX: 37.57 KG/M2 | HEART RATE: 76 BPM | DIASTOLIC BLOOD PRESSURE: 86 MMHG | OXYGEN SATURATION: 96 % | RESPIRATION RATE: 18 BRPM

## 2024-03-26 DIAGNOSIS — M06.9 RHEUMATOID ARTHRITIS INVOLVING MULTIPLE JOINTS (H): Primary | ICD-10-CM

## 2024-03-26 PROCEDURE — 99213 OFFICE O/P EST LOW 20 MIN: CPT | Performed by: PHYSICIAN ASSISTANT

## 2024-03-26 ASSESSMENT — PAIN SCALES - GENERAL: PAINLEVEL: NO PAIN (0)

## 2024-03-26 NOTE — PATIENT INSTRUCTIONS
After Visit Instructions:     Thank you for coming to [facility] for your care. It is my goal to partner with you to help you reach your optimal state of health.       Plan:     Schedule follow-up with Leonor Awad PA-C in 4 months.   Labs: No labs needed if you're off medication    Leonor Awad PA-C  [facility]  [facility]    Contact information: [facility]  Clinic Number:  [phone]  Please call or send a [employer] message with any questions about your care

## 2024-07-22 NOTE — PROGRESS NOTES
Rheumatology Clinic Visit  [facility]  TRESSA Downey     Shady Maguire MRN# [id number]   YOB: 1985 Age: 38 year old   Date of Visit: 7/23/2024  Primary care provider: Hilary Hammer [facility]          Assessment and Plan:     1.  Rheumatoid arthritis  2.  CCP antibody positive      Patient presents today for follow-up regarding his seropositive rheumatoid arthritis.  He is continue to remain off medications and states that he is doing quite well.  He does get some pain when making a fist in his bilateral index PIPs but states that it is not prohibitive.  No  strength changes.  No swelling.  Physical examination today did not show any active synovitis.  At this time okay to continue to monitor.  Plan for x-rays at his next visit in 6 months.     Plan:   Schedule follow-up with Leonor Awad PA-C in 6 months.   Imaging: Plan to repeat x-rays in 6 months at his next visit    TRESSA Downey  Rheumatology         History of Present Illness:   Shady Maguire presents for evaluation of polyarthritis.     Interval history July 23, 2024:  Doing well. No health changes since his last visit. No new joint symptoms.    Interval history March 26, 2024:  He had influenza in February. It was accompanied with parotid glad swelling. It was severe pain as well. Had a day where he was unable to chew food. He was given an antibiotic with urgent care. It resolved and then his family got the virus. Then he got another viral infection. He stopped methotrexate during this time. He did not restart the medication and has not had a flare.     He does have a tightness in his right forefinger and left middle finger. He cracks those fingers.     Interval history December 11, 2023:  He currently has a cold. He is one week in and it has consisted of congestion and runny nose. Since mid-October, he has been doing 2-3 weeks off the Methotrexate as he is trying to recover from a cold. Then he takes it for  "another for a couple weeks,then gets another cold. He states that his joints are generally good. He has had episodes where he had joint pain.     Interval history August 29, 2023:  He has had some joint symptoms that he has noticed. He feels that he can usually explain them. He has continued on the 4 tablets of Methotrexate.     Interval history June 7, 2023:  Joints have been good. He has not had any unusual joint symptoms. If there has been increased joint pain, he can usual determine a trigger. He does usually take his Methotrexate on Thursdays, but has had some weeks that he forgets. He has been taking his Folic acid. The mouth sores are spotty and back to \"normal\".      Interval history October 25, 2022:  His joints have been doing well. He has not had trouble with morning stiffness in his joints. He is tolerating the methotrexate. He takes his 4 tablets on Thursdays. He has been doing Folic Acid, 2mg. The mouth sores have decreased. He has not needed to use the Magic Mouthwash.     Interval history 7/19/2022:  He states that his joints have been pretty good. He states that he has had a couple mild episodes of joint pain, but nothing compared to what they were before. The knee is doing well also. No morning stiffness. He takes the Methotrexate on Thursdays. He did not have any side effects from the Methotrexate for the first couple weeks, but after he finished the prednisone, when he took the methotrexate, on Friday or Saturday he had multiple mouth sores. This caused him to not want to eat food, he did, but there was decreased motivation. There was pain with sores. He is taking Folic Acid daily. This happened 2 thursdays ago. When he took the methotrexate again, it did not happen again.     Consult visit from 6/8/2022:  He has been keeping track of pain that has started and where it has been. This initiated after having knee pain that was severe. Xray was normal. 2 days later he was unable lift his left arm " "up. He then had blood work done and put together a list. He has had pain in multiple joints including right wrist, left knee, left elbow, multiple fingers, left wrist, hips, shoulders, knees and toes. In these joints he has had sharp pains with movements, some pain even with rest. Difficulty with gripping. The pains have lasted anywhere from 1/2 day to 3+ days. He started prednisone. He states that seemed to help awhile. He started it on 5/9/2022. Before that every other day something new would start. He had bout 1.5 weeks with no pain, missed a couple prednisone doses and got back on it. Since then, it is back to every couple days. He has not had any morning stiffness. He can usually feel things starting the night before. He can feel a pain starting and the next morning he has the pain in that joint. Over the last couple years he \"threw\" his back out twice. It has not been back to 100% since then. He is unsure if he has had joint swelling. He has asked his wife as well, but they have not noticed significant swelling. No fatigue. No shortness of breath, coughing or chest pain. No skin rashes or mouth sores. History of smoking, limited now.     No family history of RA or lupus. He has a brother with crohn's. No personal history of psoriasis, ulcerative colitis or crohn's.          Review of Systems:     Constitutional: negative  Skin: negative  Eyes: negative  Ears/Nose/Throat: negative  Respiratory: No shortness of breath, dyspnea on exertion, cough, or hemoptysis  Cardiovascular: negative  Gastrointestinal: negative  Genitourinary: negative  Musculoskeletal: As above  Neurologic: negative  Psychiatric: negative  Hematologic/Lymphatic/Immunologic: negative  Endocrine: negative         Active Problem List:     Patient Active Problem List    Diagnosis Date Noted    Rheumatoid arthritis (H) 10/25/2023     Priority: Medium    Acute midline low back pain without sciatica 10/14/2019     Priority: Medium    Obesity (BMI " 35.0-39.9) with comorbidity (H) 10/14/2019     Priority: Medium            Past Medical History:   History reviewed. No pertinent past medical history.  Past Surgical History:   Procedure Laterality Date    VASECTOMY Bilateral 2023            Social History:     Social History     Socioeconomic History    Marital status:      Spouse name: Not on file    Number of children: Not on file    Years of education: Not on file    Highest education level: Not on file   Occupational History    Not on file   Tobacco Use    Smoking status: Former     Current packs/day: 0.00     Types: Cigarettes     Quit date: 2010     Years since quittin.5     Passive exposure: Never    Smokeless tobacco: Former     Types: Chew    Tobacco comments:     Smoked for a year while in college-only a social smoker   Vaping Use    Vaping status: Never Used   Substance and Sexual Activity    Alcohol use: Yes     Comment: occasional    Drug use: Not Currently    Sexual activity: Yes     Partners: Female   Other Topics Concern    Not on file   Social History Narrative    2022    ENVIRONMENTAL HISTORY: [place] The home is heated with a forced air. They do have central air conditioning. The patient's bedroom is furnished with hard malinda in bedroom.  No pets. There is no history of cockroach or mice infestation. There is/are 0 smokers in the house.  The house does not have a damp basement. Noticed mold growth on basement window ki.     Social Determinants of Health     Financial Resource Strain: Low Risk  (10/25/2023)    Financial Resource Strain     Within the past 12 months, have you or your family members you live with been unable to get utilities (heat, electricity) when it was really needed?: No   Food Insecurity: Low Risk  (10/25/2023)    Food Insecurity     Within the past 12 months, did you worry that your food would run out before you got money to buy more?: No      Within the past 12 months, did the food you bought just not last and you didn t have money to get more?: No   Transportation Needs: Low Risk  (10/25/2023)    Transportation Needs     Within the past 12 months, has lack of transportation kept you from medical appointments, getting your medicines, non-medical meetings or appointments, work, or from getting things that you need?: No   Physical Activity: Not on file   Stress: Not on file   Social Connections: Not on file   Interpersonal Safety: Low Risk  (10/25/2023)    Interpersonal Safety     Do you feel physically and emotionally safe where you currently live?: Yes     Within the past 12 months, have you been hit, slapped, kicked or otherwise physically hurt by someone?: No     Within the past 12 months, have you been humiliated or emotionally abused in other ways by your partner or ex-partner?: No   Housing Stability: Low Risk  (10/25/2023)    Housing Stability     Do you have housing? : Yes     Are you worried about losing your housing?: No          Family History:     Family History   Problem Relation Age of Onset    Hypertension Father     Allergies Brother         Anesthesia    Allergies Brother         Bees    Crohn's Disease Brother     Heart Disease Maternal Grandmother     Cancer Maternal Grandfather         skin ca    Heart Disease Maternal Grandfather     Heart Disease Paternal Grandmother     Heart Disease Paternal Grandfather             Allergies:   No Known Allergies         Medications:     Current Outpatient Medications   Medication Sig Dispense Refill    fluticasone (FLONASE) 50 MCG/ACT nasal spray INSTILL 1 SPRAY INTO BOTH NOSTRILS DAILY (Patient not taking: Reported on 3/26/2024) 48 mL 1    folic acid (FOLVITE) 1 MG tablet Take 2 tablets (2 mg) by mouth daily (Patient not taking: Reported on 3/26/2024) 180 tablet 2    ibuprofen (ADVIL/MOTRIN) 200 MG tablet Take 200 mg by mouth (Patient not taking: Reported on 7/23/2024)      methocarbamol  "(ROBAXIN) 750 MG tablet 1 tab by mouth every 4 hrs. or 2 tabs every 8 hrs. as needed for muscle spasm. (Patient not taking: Reported on 3/26/2024) 30 tablet 1    methotrexate 2.5 MG tablet Take 4 tablets (10 mg) by mouth every 7 days Labs every 8-12 weeks. (Patient not taking: Reported on 3/26/2024) 48 tablet 0    olopatadine (PATADAY) 0.2 % ophthalmic solution Place 1 drop into both eyes daily Using prn (Patient not taking: Reported on 3/26/2024)              Physical Exam:   Blood pressure (!) 135/93, pulse 95, temperature (!) 96.7  F (35.9  C), temperature source Tympanic, resp. rate 20, height 1.816 m (5' 11.5\"), weight 126.2 kg (278 lb 3.2 oz), SpO2 97%.  Wt Readings from Last 6 Encounters:   07/23/24 126.2 kg (278 lb 3.2 oz)   03/26/24 125.8 kg (277 lb 6.4 oz)   12/11/23 127 kg (280 lb)   10/25/23 126.4 kg (278 lb 9.6 oz)   08/29/23 126 kg (277 lb 12.8 oz)   06/06/23 124.6 kg (274 lb 9.6 oz)     Constitutional: well-developed, appearing stated age; cooperative  Eyes: nl conjunctiva, sclera  ENT: nl external ears, hearing  Neck: no mass or thyroid enlargement  Resp: No shortness of breath with normal conversation  MSK: No active synovitis or tenderness to palpation. Full fist formation.   Skin: dry skin on hands  Psych: nl judgement, orientation, memory, affect.           Data:   Imaging:  Xray left knee 4/14/2022  Impression:     1.  Normal left knee. Normal joint alignment and spacing. No fracture  or bone lesion. No significant joint effusion.    6/8/2022 x-ray bilateral hands and wrists  IMPRESSION: No erosion or joint space narrowing. No soft tissue  swelling. Normal mineralization. Normal alignment.    Laboratory:  4/22/2022  CRP <2.9    Rheumatoid factor 31  Sed rate 7  Lyme 0.71  TIMOTHY negative    6/8/2022  Creatinine 1.01, GFR greater than 90  ALT 34, AST 15  CBC within normal limits    7/19/2022  Creatinine 0.89, GFR greater than 90  Albumin 4.2  ALT 47, AST 22  CBC within normal " limits    8/19/2022   CBC within normal limits  Creatinine 0.90, GFR greater than 90  Albumin 4.2  ALT 50, AST 28    3/27/2023  Creatinine 1.01, GFR >90  Albumin 4.6  ALT 50, AST 32  White blood cell count 5.9, hemoglobin 14.8, platelet count 263    6/6/2023  Creatinine 1.08, GFR greater than 90  Albumin 4.6  ALT 49, AST 31  White blood cell count 5.1, hemoglobin 14.4, platelet count 282    8/29/2023  Creatinine 0.93, GFR greater than 90  Albumin 4.8  ALT 46, AST 26  White blood cell count 6.9, hemoglobin 14.6, platelet count 277    12/11/2023  Creatinine 1.00, GFR >90  Albumin 4.7  ALT 38, AST 28  WBC 9.3, hgb 14.2, plt 298

## 2024-07-23 ENCOUNTER — OFFICE VISIT (OUTPATIENT)
Dept: RHEUMATOLOGY | Facility: CLINIC | Age: 39
End: 2024-07-23
Payer: COMMERCIAL

## 2024-07-23 VITALS
RESPIRATION RATE: 20 BRPM | HEART RATE: 95 BPM | BODY MASS INDEX: 37.68 KG/M2 | DIASTOLIC BLOOD PRESSURE: 93 MMHG | TEMPERATURE: 96.7 F | OXYGEN SATURATION: 97 % | HEIGHT: 72 IN | SYSTOLIC BLOOD PRESSURE: 135 MMHG | WEIGHT: 278.2 LBS

## 2024-07-23 DIAGNOSIS — M06.9 RHEUMATOID ARTHRITIS INVOLVING MULTIPLE JOINTS (H): Primary | ICD-10-CM

## 2024-07-23 DIAGNOSIS — R76.8 CYCLIC CITRULLINATED PEPTIDE (CCP) ANTIBODY POSITIVE: ICD-10-CM

## 2024-07-23 PROCEDURE — 99213 OFFICE O/P EST LOW 20 MIN: CPT | Performed by: PHYSICIAN ASSISTANT

## 2024-07-23 ASSESSMENT — PAIN SCALES - GENERAL: PAINLEVEL: NO PAIN (0)

## 2024-07-23 NOTE — PATIENT INSTRUCTIONS
After Visit Instructions:     Thank you for coming to [facility] for your care. It is my goal to partner with you to help you reach your optimal state of health.       Plan:     Schedule follow-up with Leonor Awad PA-C in 6 months.     Leonor Awad PA-C  [facility]  [facility]    Contact information: [facility]  Clinic Number:  [phone]  Please call or send a [employer] message with any questions about your care

## 2024-08-19 ENCOUNTER — HOSPITAL ENCOUNTER (EMERGENCY)
Facility: CLINIC | Age: 39
Discharge: HOME | End: 2024-08-19
Payer: COMMERCIAL

## 2024-08-19 VITALS
DIASTOLIC BLOOD PRESSURE: 96 MMHG | SYSTOLIC BLOOD PRESSURE: 153 MMHG | RESPIRATION RATE: 16 BRPM | TEMPERATURE: 98.6 F | OXYGEN SATURATION: 98 % | HEART RATE: 86 BPM

## 2024-08-19 DIAGNOSIS — M54.50 LOW BACK PAIN: ICD-10-CM

## 2024-08-19 PROCEDURE — 99213 OFFICE O/P EST LOW 20 MIN: CPT

## 2024-08-19 PROCEDURE — G0463 HOSPITAL OUTPT CLINIC VISIT: HCPCS

## 2024-08-19 RX ORDER — PREDNISONE 20 MG/1
TABLET ORAL
Qty: 10 TABLET | Refills: 0 | Status: SHIPPED | OUTPATIENT
Start: 2024-08-19

## 2024-08-19 RX ORDER — OXYCODONE HYDROCHLORIDE 5 MG/1
5 TABLET ORAL EVERY 6 HOURS PRN
Qty: 6 TABLET | Refills: 0 | Status: SHIPPED | OUTPATIENT
Start: 2024-08-19 | End: 2024-08-21

## 2024-08-19 RX ORDER — CYCLOBENZAPRINE HCL 10 MG
10 TABLET ORAL 3 TIMES DAILY PRN
Qty: 30 TABLET | Refills: 0 | Status: SHIPPED | OUTPATIENT
Start: 2024-08-19 | End: 2024-08-29

## 2024-08-19 ASSESSMENT — ACTIVITIES OF DAILY LIVING (ADL)
ADLS_ACUITY_SCORE: 35
ADLS_ACUITY_SCORE: 35

## 2024-08-19 ASSESSMENT — COLUMBIA-SUICIDE SEVERITY RATING SCALE - C-SSRS
2. HAVE YOU ACTUALLY HAD ANY THOUGHTS OF KILLING YOURSELF IN THE PAST MONTH?: NO
6. HAVE YOU EVER DONE ANYTHING, STARTED TO DO ANYTHING, OR PREPARED TO DO ANYTHING TO END YOUR LIFE?: NO
1. IN THE PAST MONTH, HAVE YOU WISHED YOU WERE DEAD OR WISHED YOU COULD GO TO SLEEP AND NOT WAKE UP?: NO

## 2024-08-19 NOTE — DISCHARGE INSTRUCTIONS
Start taking prednisone with food once daily for the next 5 days.  Do not take NSAIDs (ibuprofen) with this.  You may continue taking Tylenol for pain.    Flexeril is a muscle relaxer this may make you sleepy.  Do not take while driving or operating heavy machinery.    Follow-up with physical therapy and orthopedics if pain worsens or does not improve over the next few days.    If numbness in groin or incontinence of bowel or bladder develop please follow-up in ER.

## 2024-09-23 ENCOUNTER — MYC MEDICAL ADVICE (OUTPATIENT)
Dept: RHEUMATOLOGY | Facility: CLINIC | Age: 39
End: 2024-09-23
Payer: COMMERCIAL

## 2024-09-23 ENCOUNTER — OFFICE VISIT (OUTPATIENT)
Dept: FAMILY MEDICINE | Facility: CLINIC | Age: 39
End: 2024-09-23
Payer: COMMERCIAL

## 2024-09-23 VITALS
DIASTOLIC BLOOD PRESSURE: 112 MMHG | WEIGHT: 289 LBS | HEART RATE: 114 BPM | SYSTOLIC BLOOD PRESSURE: 158 MMHG | TEMPERATURE: 99.9 F | BODY MASS INDEX: 39.14 KG/M2 | HEIGHT: 72 IN | OXYGEN SATURATION: 96 % | RESPIRATION RATE: 16 BRPM

## 2024-09-23 DIAGNOSIS — M05.79 RHEUMATOID ARTHRITIS INVOLVING MULTIPLE SITES WITH POSITIVE RHEUMATOID FACTOR (H): Primary | ICD-10-CM

## 2024-09-23 DIAGNOSIS — M06.9 RHEUMATOID ARTHRITIS INVOLVING MULTIPLE JOINTS (H): Primary | ICD-10-CM

## 2024-09-23 DIAGNOSIS — E66.01 CLASS 2 SEVERE OBESITY WITH SERIOUS COMORBIDITY AND BODY MASS INDEX (BMI) OF 38.0 TO 38.9 IN ADULT, UNSPECIFIED OBESITY TYPE (H): ICD-10-CM

## 2024-09-23 DIAGNOSIS — E66.812 CLASS 2 SEVERE OBESITY WITH SERIOUS COMORBIDITY AND BODY MASS INDEX (BMI) OF 38.0 TO 38.9 IN ADULT, UNSPECIFIED OBESITY TYPE (H): ICD-10-CM

## 2024-09-23 PROCEDURE — 99213 OFFICE O/P EST LOW 20 MIN: CPT | Performed by: NURSE PRACTITIONER

## 2024-09-23 RX ORDER — OXYCODONE HYDROCHLORIDE 5 MG/1
5 TABLET ORAL EVERY 6 HOURS PRN
Qty: 8 TABLET | Refills: 0 | Status: SHIPPED | OUTPATIENT
Start: 2024-09-23

## 2024-09-23 ASSESSMENT — PAIN SCALES - GENERAL: PAINLEVEL: SEVERE PAIN (6)

## 2024-09-23 NOTE — PROGRESS NOTES
Assessment & Plan     Rheumatoid arthritis involving multiple sites with positive rheumatoid factor (H)  Patient reports that he will follow through with plan laid out by his rheumatology provider.  Discussed with patient that his pain should resolve in a few days after starting the prednisone.  He is asking for pain medication for 2 days until the prednisone starts working.  Apparently his rheumatology provider told him to request pain medication from primary care clinic.  Discussed with patient that I would give him a prescription for 2 days, 8 tablets dispensed.  - oxyCODONE (ROXICODONE) 5 MG tablet; Take 1 tablet (5 mg) by mouth every 6 hours as needed for pain.    Class 2 severe obesity with serious comorbidity and body mass index (BMI) of 38.0 to 38.9 in adult, unspecified obesity type (H)  Known issue that I take into account for their medical decisions, no current exacerbations or new concerns  Weight loss may help with blood pressure control.    The risks, benefits and treatment options of prescribed medications or other treatments have been discussed with the patient. The patient verbalized their understanding and should call or follow up if no improvement or if they develop further problems.  ANABEL Rothman is a 39 year old, presenting for the following health issues:  Arthritis (Patient has RA, he has been having increase pain in Left Hip . Patient would like pain meds for the next few days. He did read rheumatologist message about Prednisone and Methrotrexate and he plans on doing that as well, wants omething in the mean time for short term for pain . )        9/23/2024     2:14 PM   Additional Questions   Roomed by Basil CARLOS CMA   Accompanied by self     History of Present Illness       Reason for visit:  Hip joint pain  Symptom onset:  Today   He is taking medications regularly.     Chief Complaint   Patient presents with    Arthritis     Patient has RA, he  "has been having increase pain in Left Hip . Patient would like pain meds for the next few days. He did read rheumatologist message about Prednisone and Methrotrexate and he plans on doing that as well, wants omething in the mean time for short term for pain .                  Review of Systems  Constitutional, HEENT, cardiovascular, pulmonary, gi and gu systems are negative, except as otherwise noted.      Objective    BP (!) 158/112 (BP Location: Right arm, Patient Position: Sitting, Cuff Size: Adult Regular)   Pulse 114   Temp 99.9  F (37.7  C) (Tympanic)   Resp 16   Ht 1.816 m (5' 11.5\")   Wt 131.1 kg (289 lb)   SpO2 96%   BMI 39.75 kg/m    Body mass index is 39.75 kg/m .  Physical Exam   GENERAL: alert and no distress            Signed Electronically by: KYAW Rothman CNP    "

## 2024-09-25 ENCOUNTER — PATIENT OUTREACH (OUTPATIENT)
Dept: CARE COORDINATION | Facility: CLINIC | Age: 39
End: 2024-09-25
Payer: COMMERCIAL

## 2024-09-25 RX ORDER — PREDNISONE 5 MG/1
TABLET ORAL
Qty: 32 TABLET | Refills: 0 | Status: SHIPPED | OUTPATIENT
Start: 2024-09-25

## 2024-10-09 ENCOUNTER — PATIENT OUTREACH (OUTPATIENT)
Dept: CARE COORDINATION | Facility: CLINIC | Age: 39
End: 2024-10-09
Payer: COMMERCIAL

## 2024-10-29 NOTE — ED PROVIDER NOTES
History   No chief complaint on file.    HPI  Shady Maguire is a 38 year old male who presents to urgent care with chief complaint of lower back pain.  Patient reports history of lower back pain last episode was approximately 1 year ago.  Patient reports this time he was picking up a child and leaned forward and felt a pulling sensation in his lower left back.  He now has pain worse with forward bending, this standing up from sitting and lateral bending.  He has been taking ibuprofen without relief.  Denies numbness or tingling in legs, incontinence of bowel or bladder, saddle anesthesia.    Allergies:  No Known Allergies    Problem List:    Patient Active Problem List    Diagnosis Date Noted    Rheumatoid arthritis (H) 10/25/2023     Priority: Medium    Acute midline low back pain without sciatica 10/14/2019     Priority: Medium    Obesity (BMI 35.0-39.9) with comorbidity (H) 10/14/2019     Priority: Medium        Past Medical History:    No past medical history on file.    Past Surgical History:    Past Surgical History:   Procedure Laterality Date    VASECTOMY Bilateral 2023       Family History:    Family History   Problem Relation Age of Onset    Hypertension Father     Allergies Brother         Anesthesia    Allergies Brother         Bees    Crohn's Disease Brother     Heart Disease Maternal Grandmother     Cancer Maternal Grandfather         skin ca    Heart Disease Maternal Grandfather     Heart Disease Paternal Grandmother     Heart Disease Paternal Grandfather        Social History:  Marital Status:   [2]  Social History     Tobacco Use    Smoking status: Former     Current packs/day: 0.00     Types: Cigarettes     Quit date: 2010     Years since quittin.6     Passive exposure: Never    Smokeless tobacco: Former     Types: Chew    Tobacco comments:     Smoked for a year while in college-only a social smoker   Vaping Use    Vaping status: Never Used   Substance Use Topics    Alcohol  use: Yes     Comment: occasional    Drug use: Not Currently        Medications:    cyclobenzaprine (FLEXERIL) 10 MG tablet  oxyCODONE (ROXICODONE) 5 MG tablet  predniSONE (DELTASONE) 20 MG tablet  fluticasone (FLONASE) 50 MCG/ACT nasal spray  folic acid (FOLVITE) 1 MG tablet  ibuprofen (ADVIL/MOTRIN) 200 MG tablet  methocarbamol (ROBAXIN) 750 MG tablet  methotrexate 2.5 MG tablet  olopatadine (PATADAY) 0.2 % ophthalmic solution          Review of Systems   All other systems reviewed and are negative.      Physical Exam   BP: (!) 153/96  Pulse: 86  Temp: 98.6  F (37  C)  Resp: 16  SpO2: 98 %      Physical Exam  Vitals and nursing note reviewed.   Constitutional:       General: He is not in acute distress.     Appearance: Normal appearance.   HENT:      Head: Normocephalic.   Cardiovascular:      Rate and Rhythm: Normal rate and regular rhythm.      Pulses: Normal pulses.      Heart sounds: Normal heart sounds.   Pulmonary:      Effort: Pulmonary effort is normal.      Breath sounds: Normal breath sounds.   Musculoskeletal:      Cervical back: Neck supple. No rigidity.      Comments: Discomfort with forward bending of spine and left lateral bending.  Full range of neck.  Out of 5 strength of upper and lower extremities bilaterally.   Neurological:      General: No focal deficit present.      Mental Status: He is alert.      Motor: No weakness.   Psychiatric:         Mood and Affect: Mood normal.         Behavior: Behavior normal.         ED Course        Procedures           No results found for this or any previous visit (from the past 24 hour(s)).    Medications - No data to display    Assessments & Plan (with Medical Decision Making)     I have reviewed the nursing notes.    I have reviewed the findings, diagnosis, plan and need for follow up with the patient.      Medical Decision Making    Patient is a 38 year old male who presents to urgent care with chief complaint of lower back pain.  Patient reports history  of lower back pain last episode was approximately 1 year ago.  Patient reports this time he was picking up a child and leaned forward and felt a pulling sensation in his lower left back.  He now has pain worse with forward bending, this standing up from sitting and lateral bending.  He has been taking ibuprofen without relief.  Denies numbness or tingling in legs, incontinence of bowel or bladder, saddle anesthesia.      We did discuss in depth physical therapy for low back pain.  Patient reports he has tried physical therapy previously which did help but he was unable to make the appointments.  I did recommend online [employer] physical therapy back strengthening workouts.    Patient with back pain. No NV deficits or complaints. No bowel or bladder dysfunction. No fevers or IV drug use. Normal strength, sensation in LE bilat. No red flags and normal neuro exam so no imaging is indicated at this time. Discussed at length with the patient and all questions fully answered. Return if symptoms persist or worsen, or any new troubling symptoms develop.     Plan: Start taking prednisone with food once daily for the next 5 days.  Do not take NSAIDs (ibuprofen) with this.  You may continue taking Tylenol for pain. Flexeril is a muscle relaxer this may make you sleepy.  Do not take while driving or operating heavy machinery. Follow-up with physical therapy and orthopedics if pain worsens or does not improve over the next few days. If numbness in groin or incontinence of bowel or bladder develop please follow-up in ER.      All questions were answered. Patient expressed understanding of the plan and was amenable to it.     Disclaimer: This note consists of symbols derived from keyboarding, dictation and/or voice recognition software. As a result, there may be errors in the script that have gone undetected. Please consider this when interpreting information found in this chart.        Discharge Medication List as of 8/19/2024   6:33 PM        START taking these medications    Details   cyclobenzaprine (FLEXERIL) 10 MG tablet Take 1 tablet (10 mg) by mouth 3 times daily as needed for muscle spasms, Disp-30 tablet, R-0, E-Prescribe      oxyCODONE (ROXICODONE) 5 MG tablet Take 1 tablet (5 mg) by mouth every 6 hours as needed for severe pain, Disp-6 tablet, R-0, E-Prescribe      predniSONE (DELTASONE) 20 MG tablet Take two tablets (= 40mg) each day for 5 (five) days, Disp-10 tablet, R-0, E-Prescribe             Final diagnoses:   Low back pain       8/19/2024   [facility] EMERGENCY DEPT       Onelia Pennington PA-C  08/20/24 [id number]     16

## 2024-12-01 ENCOUNTER — HEALTH MAINTENANCE LETTER (OUTPATIENT)
Age: 39
End: 2024-12-01

## 2025-01-21 NOTE — PROGRESS NOTES
Rheumatology Clinic Visit  [facility]  TRESSA Downey     Shady Maguire MRN# [id number]   YOB: 1985 Age: 39 year old   Date of Visit: 1/28/2025  [facility] provider[facility]          Assessment and Plan:     1.  Rheumatoid arthritis  2.  CCP antibody positive      Patient presents today for follow-up regarding his seropositive rheumatoid arthritis.  He states that besides the flare he had in the fall he feels that his joints have been doing good.  At this time he does not feel that he needs any medications.  He does not have any dactylitis or synovitis on exam he does have full fist formation.  Will update his x-rays of his hands and his wrist today.  Certainly if there is any evidence of any damage I would recommend systemic therapy otherwise we will continue to monitor.  He will follow-up with me in 6 months, sooner if needed.      Plan:   Schedule follow-up with Leonor Awad PA-C in 6 months.   Imaging: xray hands and wrist  Lab: CBC, creatinine, Albumin, AST, ALT, CRP and Sed Rate today      TRESSA Downey  Rheumatology         History of Present Illness:   Shayd Maguire presents for evaluation of polyarthritis.     Interval history January 28, 2025:  Besides the flare he had in the fall, he has been doing good. No significant joint symptoms.      Interval history July 23, 2024:  Doing well. No health changes since his last visit. No new joint symptoms.    Interval history March 26, 2024:  He had influenza in February. It was accompanied with parotid glad swelling. It was severe pain as well. Had a day where he was unable to chew food. He was given an antibiotic with urgent care. It resolved and then his family got the virus. Then he got another viral infection. He stopped methotrexate during this time. He did not restart the medication and has not had a flare.     He does have a tightness in his right forefinger and left middle finger. He cracks those  "fingers.     Interval history December 11, 2023:  He currently has a cold. He is one week in and it has consisted of congestion and runny nose. Since mid-October, he has been doing 2-3 weeks off the Methotrexate as he is trying to recover from a cold. Then he takes it for another for a couple weeks,then gets another cold. He states that his joints are generally good. He has had episodes where he had joint pain.     Interval history August 29, 2023:  He has had some joint symptoms that he has noticed. He feels that he can usually explain them. He has continued on the 4 tablets of Methotrexate.     Interval history June 7, 2023:  Joints have been good. He has not had any unusual joint symptoms. If there has been increased joint pain, he can usual determine a trigger. He does usually take his Methotrexate on Thursdays, but has had some weeks that he forgets. He has been taking his Folic acid. The mouth sores are spotty and back to \"normal\".      Interval history October 25, 2022:  His joints have been doing well. He has not had trouble with morning stiffness in his joints. He is tolerating the methotrexate. He takes his 4 tablets on Thursdays. He has been doing Folic Acid, 2mg. The mouth sores have decreased. He has not needed to use the Magic Mouthwash.     Interval history 7/19/2022:  He states that his joints have been pretty good. He states that he has had a couple mild episodes of joint pain, but nothing compared to what they were before. The knee is doing well also. No morning stiffness. He takes the Methotrexate on Thursdays. He did not have any side effects from the Methotrexate for the first couple weeks, but after he finished the prednisone, when he took the methotrexate, on Friday or Saturday he had multiple mouth sores. This caused him to not want to eat food, he did, but there was decreased motivation. There was pain with sores. He is taking Folic Acid daily. This happened 2 thursdays ago. When he took the " "methotrexate again, it did not happen again.     Consult visit from 6/8/2022:  He has been keeping track of pain that has started and where it has been. This initiated after having knee pain that was severe. Xray was normal. 2 days later he was unable lift his left arm up. He then had blood work done and put together a list. He has had pain in multiple joints including right wrist, left knee, left elbow, multiple fingers, left wrist, hips, shoulders, knees and toes. In these joints he has had sharp pains with movements, some pain even with rest. Difficulty with gripping. The pains have lasted anywhere from 1/2 day to 3+ days. He started prednisone. He states that seemed to help awhile. He started it on 5/9/2022. Before that every other day something new would start. He had bout 1.5 weeks with no pain, missed a couple prednisone doses and got back on it. Since then, it is back to every couple days. He has not had any morning stiffness. He can usually feel things starting the night before. He can feel a pain starting and the next morning he has the pain in that joint. Over the last couple years he \"threw\" his back out twice. It has not been back to 100% since then. He is unsure if he has had joint swelling. He has asked his wife as well, but they have not noticed significant swelling. No fatigue. No shortness of breath, coughing or chest pain. No skin rashes or mouth sores. History of smoking, limited now.     No family history of RA or lupus. He has a brother with crohn's. No personal history of psoriasis, ulcerative colitis or crohn's.          Review of Systems:     Constitutional: negative  Skin: negative  Eyes: negative  Ears/Nose/Throat: negative  Respiratory: No shortness of breath, dyspnea on exertion, cough, or hemoptysis  Cardiovascular: negative  Gastrointestinal: negative  Genitourinary: negative  Musculoskeletal: As above  Neurologic: negative  Psychiatric: negative  Hematologic/Lymphatic/Immunologic: " negative  Endocrine: negative         Active Problem List:     Patient Active Problem List    Diagnosis Date Noted    Rheumatoid arthritis (H) 10/25/2023     Priority: Medium    Acute midline low back pain without sciatica 10/14/2019     Priority: Medium    Obesity (BMI 35.0-39.9) with comorbidity (H) 10/14/2019     Priority: Medium            Past Medical History:   No past medical history on file.  Past Surgical History:   Procedure Laterality Date    VASECTOMY Bilateral 11/07/2023            Social History:     Social History     Socioeconomic History    Marital status:      Spouse name: Not on file    Number of children: Not on file    Years of education: Not on file    Highest education level: Not on file   Occupational History    Not on file   Tobacco Use    Smoking status: Former     Current packs/day: 0.00     Types: Cigarettes     Quit date: 1/1/2010     Years since quitting: 15.0     Passive exposure: Never    Smokeless tobacco: Former     Types: Chew    Tobacco comments:     Smoked for a year while in college-only a social smoker   Vaping Use    Vaping status: Never Used   Substance and Sexual Activity    Alcohol use: Yes     Comment: occasional    Drug use: Not Currently    Sexual activity: Yes     Partners: Female   Other Topics Concern    Not on file   Social History Narrative    January 28, 2022    ENVIRONMENTAL HISTORY: [place] The home is heated with a forced air. They do have central air conditioning. The patient's bedroom is furnished with hard malinda in bedroom.  No pets. There is no history of cockroach or mice infestation. There is/are 0 smokers in the house.  The house does not have a damp basement. Noticed mold growth on basement window ki.     Social Drivers of Health     Financial Resource Strain: Low Risk  (10/25/2023)    Financial Resource Strain     Within the past 12 months, have you or your family members you live with been  unable to get utilities (heat, electricity) when it was really needed?: No   Food Insecurity: Low Risk  (10/25/2023)    Food Insecurity     Within the past 12 months, did you worry that your food would run out before you got money to buy more?: No     Within the past 12 months, did the food you bought just not last and you didn t have money to get more?: No   Transportation Needs: Low Risk  (10/25/2023)    Transportation Needs     Within the past 12 months, has lack of transportation kept you from medical appointments, getting your medicines, non-medical meetings or appointments, work, or from getting things that you need?: No   Physical Activity: Not on file   Stress: Not on file   Social Connections: Not on file   Interpersonal Safety: Low Risk  (9/23/2024)    Interpersonal Safety     Do you feel physically and emotionally safe where you currently live?: Yes     Within the past 12 months, have you been hit, slapped, kicked or otherwise physically hurt by someone?: No     Within the past 12 months, have you been humiliated or emotionally abused in other ways by your partner or ex-partner?: No   Housing Stability: Low Risk  (10/25/2023)    Housing Stability     Do you have housing? : Yes     Are you worried about losing your housing?: No          Family History:     Family History   Problem Relation Age of Onset    Hypertension Father     Allergies Brother         Anesthesia    Allergies Brother         Bees    Crohn's Disease Brother     Heart Disease Maternal Grandmother     Cancer Maternal Grandfather         skin ca    Heart Disease Maternal Grandfather     Heart Disease Paternal Grandmother     Heart Disease Paternal Grandfather             Allergies:   No Known Allergies         Medications:     Current Outpatient Medications   Medication Sig Dispense Refill    fluticasone (FLONASE) 50 MCG/ACT nasal spray INSTILL 1 SPRAY INTO BOTH NOSTRILS DAILY (Patient not taking: Reported on 1/28/2025) 48 mL 1    folic acid  "(FOLVITE) 1 MG tablet Take 2 tablets (2 mg) by mouth daily (Patient not taking: Reported on 1/28/2025) 180 tablet 2    ibuprofen (ADVIL/MOTRIN) 200 MG tablet Take 200 mg by mouth (Patient not taking: Reported on 1/28/2025)      methocarbamol (ROBAXIN) 750 MG tablet 1 tab by mouth every 4 hrs. or 2 tabs every 8 hrs. as needed for muscle spasm. (Patient not taking: Reported on 1/28/2025) 30 tablet 1    methotrexate 2.5 MG tablet Take 4 tablets (10 mg) by mouth every 7 days Labs every 8-12 weeks. (Patient not taking: Reported on 1/28/2025) 48 tablet 0    olopatadine (PATADAY) 0.2 % ophthalmic solution Place 1 drop into both eyes daily Using prn (Patient not taking: Reported on 1/28/2025)      oxyCODONE (ROXICODONE) 5 MG tablet Take 1 tablet (5 mg) by mouth every 6 hours as needed for pain. (Patient not taking: Reported on 1/28/2025) 8 tablet 0    predniSONE (DELTASONE) 20 MG tablet Take two tablets (= 40mg) each day for 5 (five) days (Patient not taking: Reported on 1/28/2025) 10 tablet 0    predniSONE (DELTASONE) 5 MG tablet Take 10 mg (2 tablets) daily x1 week, then take 7.5 mg (1.5 tablets) daily x1 week, then take 5 mg (1 tablet) daily x1 week then stop.  Take with food. (Patient not taking: Reported on 1/28/2025) 32 tablet 0            Physical Exam:   Blood pressure (!) 145/95, pulse 95, temperature 97  F (36.1  C), temperature source Tympanic, resp. rate 18, height 1.816 m (5' 11.5\"), weight 126.4 kg (278 lb 9.6 oz), SpO2 95%.  Wt Readings from Last 6 Encounters:   09/23/24 131.1 kg (289 lb)   07/23/24 126.2 kg (278 lb 3.2 oz)   03/26/24 125.8 kg (277 lb 6.4 oz)   12/11/23 127 kg (280 lb)   10/25/23 126.4 kg (278 lb 9.6 oz)   08/29/23 126 kg (277 lb 12.8 oz)     Constitutional: well-developed, appearing stated age; cooperative  Eyes: nl conjunctiva, sclera  ENT: nl external ears, hearing  Neck: no mass or thyroid enlargement  Resp: No shortness of breath with normal conversation  MSK: No active synovitis or " tenderness to palpation. Full fist formation.   Skin: dry skin on hands/knuckles  Psych: nl judgement, orientation, memory, affect.           Data:   Imaging:  Xray left knee 4/14/2022  Impression:     1.  Normal left knee. Normal joint alignment and spacing. No fracture  or bone lesion. No significant joint effusion.    6/8/2022 x-ray bilateral hands and wrists  IMPRESSION: No erosion or joint space narrowing. No soft tissue  swelling. Normal mineralization. Normal alignment.    Laboratory:  4/22/2022  CRP <2.9    Rheumatoid factor 31  Sed rate 7  Lyme 0.71  TIMOTHY negative    6/8/2022  Creatinine 1.01, GFR greater than 90  ALT 34, AST 15  CBC within normal limits    7/19/2022  Creatinine 0.89, GFR greater than 90  Albumin 4.2  ALT 47, AST 22  CBC within normal limits    8/19/2022   CBC within normal limits  Creatinine 0.90, GFR greater than 90  Albumin 4.2  ALT 50, AST 28    3/27/2023  Creatinine 1.01, GFR >90  Albumin 4.6  ALT 50, AST 32  White blood cell count 5.9, hemoglobin 14.8, platelet count 263    6/6/2023  Creatinine 1.08, GFR greater than 90  Albumin 4.6  ALT 49, AST 31  White blood cell count 5.1, hemoglobin 14.4, platelet count 282    8/29/2023  Creatinine 0.93, GFR greater than 90  Albumin 4.8  ALT 46, AST 26  White blood cell count 6.9, hemoglobin 14.6, platelet count 277    12/11/2023  Creatinine 1.00, GFR >90  Albumin 4.7  ALT 38, AST 28  WBC 9.3, hgb 14.2, plt 298

## 2025-01-28 ENCOUNTER — OFFICE VISIT (OUTPATIENT)
Dept: RHEUMATOLOGY | Facility: CLINIC | Age: 40
End: 2025-01-28
Payer: COMMERCIAL

## 2025-01-28 ENCOUNTER — ANCILLARY PROCEDURE (OUTPATIENT)
Dept: GENERAL RADIOLOGY | Facility: CLINIC | Age: 40
End: 2025-01-28
Attending: PHYSICIAN ASSISTANT
Payer: COMMERCIAL

## 2025-01-28 VITALS
RESPIRATION RATE: 18 BRPM | TEMPERATURE: 97 F | OXYGEN SATURATION: 95 % | DIASTOLIC BLOOD PRESSURE: 95 MMHG | HEIGHT: 72 IN | WEIGHT: 278.6 LBS | SYSTOLIC BLOOD PRESSURE: 145 MMHG | BODY MASS INDEX: 37.73 KG/M2 | HEART RATE: 95 BPM

## 2025-01-28 DIAGNOSIS — R76.8 CYCLIC CITRULLINATED PEPTIDE (CCP) ANTIBODY POSITIVE: ICD-10-CM

## 2025-01-28 DIAGNOSIS — M06.9 RHEUMATOID ARTHRITIS INVOLVING MULTIPLE JOINTS (H): Primary | ICD-10-CM

## 2025-01-28 DIAGNOSIS — M06.9 RHEUMATOID ARTHRITIS INVOLVING MULTIPLE JOINTS (H): ICD-10-CM

## 2025-01-28 LAB
ALBUMIN SERPL BCG-MCNC: 4.7 G/DL (ref 3.5–5.2)
ALT SERPL W P-5'-P-CCNC: 53 U/L (ref 0–70)
AST SERPL W P-5'-P-CCNC: 32 U/L (ref 0–45)
CREAT SERPL-MCNC: 1.07 MG/DL (ref 0.67–1.17)
CRP SERPL-MCNC: 3.31 MG/L
EGFRCR SERPLBLD CKD-EPI 2021: >90 ML/MIN/1.73M2
ERYTHROCYTE [DISTWIDTH] IN BLOOD BY AUTOMATED COUNT: 12.1 % (ref 10–15)
ERYTHROCYTE [SEDIMENTATION RATE] IN BLOOD BY WESTERGREN METHOD: 6 MM/HR (ref 0–15)
HCT VFR BLD AUTO: 45.7 % (ref 40–53)
HGB BLD-MCNC: 15.5 G/DL (ref 13.3–17.7)
MCH RBC QN AUTO: 28.3 PG (ref 26.5–33)
MCHC RBC AUTO-ENTMCNC: 33.9 G/DL (ref 31.5–36.5)
MCV RBC AUTO: 84 FL (ref 78–100)
PLATELET # BLD AUTO: 303 10E3/UL (ref 150–450)
RBC # BLD AUTO: 5.47 10E6/UL (ref 4.4–5.9)
WBC # BLD AUTO: 6.2 10E3/UL (ref 4–11)

## 2025-01-28 PROCEDURE — 84450 TRANSFERASE (AST) (SGOT): CPT | Performed by: PHYSICIAN ASSISTANT

## 2025-01-28 PROCEDURE — 36415 COLL VENOUS BLD VENIPUNCTURE: CPT | Performed by: PHYSICIAN ASSISTANT

## 2025-01-28 PROCEDURE — 73130 X-RAY EXAM OF HAND: CPT | Mod: TC | Performed by: RADIOLOGY

## 2025-01-28 PROCEDURE — 86140 C-REACTIVE PROTEIN: CPT | Performed by: PHYSICIAN ASSISTANT

## 2025-01-28 PROCEDURE — 85027 COMPLETE CBC AUTOMATED: CPT | Performed by: PHYSICIAN ASSISTANT

## 2025-01-28 PROCEDURE — 99213 OFFICE O/P EST LOW 20 MIN: CPT | Performed by: PHYSICIAN ASSISTANT

## 2025-01-28 PROCEDURE — 73110 X-RAY EXAM OF WRIST: CPT | Mod: TC | Performed by: RADIOLOGY

## 2025-01-28 PROCEDURE — 82565 ASSAY OF CREATININE: CPT | Performed by: PHYSICIAN ASSISTANT

## 2025-01-28 PROCEDURE — 84460 ALANINE AMINO (ALT) (SGPT): CPT | Performed by: PHYSICIAN ASSISTANT

## 2025-01-28 PROCEDURE — 82040 ASSAY OF SERUM ALBUMIN: CPT | Performed by: PHYSICIAN ASSISTANT

## 2025-01-28 PROCEDURE — 85652 RBC SED RATE AUTOMATED: CPT | Performed by: PHYSICIAN ASSISTANT

## 2025-01-28 ASSESSMENT — PAIN SCALES - GENERAL: PAINLEVEL_OUTOF10: NO PAIN (0)

## 2025-01-28 NOTE — PATIENT INSTRUCTIONS
After Visit Instructions:     Thank you for coming to [facility] for your care. It is my goal to partner with you to help you reach your optimal state of health.       Plan:     Schedule follow-up with Leonor Awad PA-C in 6 months.   Imaging: xray hands and wrist    Leonor Awad PA-C  [facility]  [facility]    Contact information: [facility]  Clinic Number:  [phone]  Please call or send a [employer] message with any questions about your care

## 2025-04-23 ENCOUNTER — PATIENT OUTREACH (OUTPATIENT)
Dept: CARE COORDINATION | Facility: CLINIC | Age: 40
End: 2025-04-23
Payer: COMMERCIAL